# Patient Record
Sex: FEMALE | Race: WHITE | NOT HISPANIC OR LATINO | Employment: OTHER | URBAN - METROPOLITAN AREA
[De-identification: names, ages, dates, MRNs, and addresses within clinical notes are randomized per-mention and may not be internally consistent; named-entity substitution may affect disease eponyms.]

---

## 2017-01-05 ENCOUNTER — OFFICE VISIT (OUTPATIENT)
Dept: INTERNAL MEDICINE | Facility: CLINIC | Age: 37
End: 2017-01-05

## 2017-01-05 VITALS — HEART RATE: 88 BPM | DIASTOLIC BLOOD PRESSURE: 79 MMHG | SYSTOLIC BLOOD PRESSURE: 113 MMHG

## 2017-01-05 DIAGNOSIS — M25.572 PAIN IN JOINT INVOLVING ANKLE AND FOOT, LEFT: Primary | ICD-10-CM

## 2017-01-05 ASSESSMENT — PAIN SCALES - GENERAL: PAINLEVEL: EXTREME PAIN (9)

## 2017-01-05 NOTE — PATIENT INSTRUCTIONS
[facility] Center Medication Refill Request Information:  * Please contact your pharmacy regarding ANY request for medication refills.  ** [facility] Prescription Fax = [phone]  * Please allow 3 business days for routine medication refills.  * Please allow 5 business days for controlled substance medication refills.     [facility] Center Test Result notification information:  *You will be notified with in 7-10 days of your appointment day regarding the results of your test.  If you are on MyChart you will be notified as soon as the provider has reviewed the results and signed off on them.
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
## 2017-01-05 NOTE — NURSING NOTE
Chief Complaint   Patient presents with     ER F/U     Pt is here to follow up after being to the ER.      Mimi Dorado LPN January 5, 2017 10:07 AM

## 2017-01-10 NOTE — PROGRESS NOTES
Attestation:  I, Lizeth Barker, saw this patient with the resident and agree with the resident s findings and plan of care as documented in the resident s note.      Lizeth Barker MD

## 2017-05-09 ENCOUNTER — TRANSFERRED RECORDS (OUTPATIENT)
Dept: HEALTH INFORMATION MANAGEMENT | Facility: CLINIC | Age: 37
End: 2017-05-09

## 2017-05-09 ENCOUNTER — OFFICE VISIT (OUTPATIENT)
Dept: INTERNAL MEDICINE | Facility: CLINIC | Age: 37
End: 2017-05-09

## 2017-05-09 VITALS
SYSTOLIC BLOOD PRESSURE: 136 MMHG | OXYGEN SATURATION: 97 % | HEART RATE: 96 BPM | RESPIRATION RATE: 20 BRPM | DIASTOLIC BLOOD PRESSURE: 88 MMHG

## 2017-05-09 DIAGNOSIS — G43.119 INTRACTABLE MIGRAINE WITH AURA WITHOUT STATUS MIGRAINOSUS: Primary | ICD-10-CM

## 2017-05-09 LAB — PAP SMEAR - HIM PATIENT REPORTED: NEGATIVE

## 2017-05-09 ASSESSMENT — PAIN SCALES - GENERAL: PAINLEVEL: SEVERE PAIN (6)

## 2017-05-09 NOTE — PATIENT INSTRUCTIONS
Primary Care Center Medication Refill Request Information:  * Please contact your pharmacy regarding ANY request for medication refills.  ** [facility] Prescription Fax = [phone]  * Please allow 3 business days for routine medication refills.  * Please allow 5 business days for controlled substance medication refills.     [facility] Center Test Result notification information:  *You will be notified with in 7-10 days of your appointment day regarding the results of your test.  If you are on MyChart you will be notified as soon as the provider has reviewed the results and signed off on them.    Primary Care Center [phone]   Mental Health Intake Line: [phone] ([place])
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
## 2017-05-09 NOTE — MR AVS SNAPSHOT
After Visit Summary   5/9/2017    Melba Cornelius    MRN: [id number]           Patient Information     Date Of Birth          1980        Visit Information        Provider Department      5/9/2017 2:25 PM Jacqueline Black MD [facility]        Care Instructions    Primary Care Center Medication Refill Request Information:  * Please contact your pharmacy regarding ANY request for medication refills.  ** [facility] Prescription Fax = [phone]  * Please allow 3 business days for routine medication refills.  * Please allow 5 business days for controlled substance medication refills.     [facility] Center Test Result notification information:  *You will be notified with in 7-10 days of your appointment day regarding the results of your test.  If you are on MyChart you will be notified as soon as the provider has reviewed the results and signed off on them.    [facility] [phone]   Mental Health Intake Line: [phone] ([place])        Follow-ups after your visit        Who to contact     Please call your clinic at [phone] to:    Ask questions about your health    Make or cancel appointments    Discuss your medicines    Learn about your test results    Speak to your doctor   If you have compliments or concerns about an experience at your clinic, or if you wish to file a complaint, please contact [facility] Patient Relations at [phone] or email us at [email or web address]         Additional Information About Your Visit        MyChart Information     HOSTEXt is an electronic gateway that provides easy, online access to your medical records. With HOSTEXt, you can request a clinic appointment, read your test results, renew a prescription or communicate with your care team.     To sign up for HOSTEXt visit the website at [email or web address]   You will be asked to enter the access code listed below, as well as some  personal information. Please follow the directions to create your username and password.     Your access code is: [email or web address]  Expires: 2017  3:13 PM     Your access code will  in 90 days. If you need help or a new code, please contact your [facility] or call [phone] for assistance.        Care EveryWhere ID     This is your Care EveryWhere ID. This could be used by other organizations to access your [place] medical records  [id number]        Your Vitals Were     Pulse Respirations Pulse Oximetry             96 20 97%          Blood Pressure from Last 3 Encounters:   17 136/88   17 113/79   11/15/16 121/84    Weight from Last 3 Encounters:   11/15/16 74.7 kg (164 lb 11.2 oz)   16 73 kg (160 lb 14.4 oz)   10/21/16 72.6 kg (160 lb)              Today, you had the following     No orders found for display       Primary Care Provider Office Phone # Fax #    Jacqueline Angelita Black [phone]       [address]  [facility] [address]        Thank you!     Thank you for choosing [facility]  for your care. Our goal is always to provide you with excellent care. Hearing back from our patients is one way we can continue to improve our services. Please take a few minutes to complete the written survey that you may receive in the mail after your visit with us. Thank you!             Your Updated Medication List - Protect others around you: Learn how to safely use, store and throw away your medicines at www.disposemymeds.org.          This list is accurate as of: 17  3:13 PM.  Always use your most recent med list.                   Brand Name Dispense Instructions for use    traMADol 50 MG tablet    ULTRAM    30 tablet    Take 1 tablet (50 mg) by mouth every 8 hours as needed for moderate pain       VISTARIL PO      Take 50 mg by mouth as needed for itching

## 2017-06-07 ENCOUNTER — TELEPHONE (OUTPATIENT)
Dept: ORTHOPEDICS | Facility: CLINIC | Age: 37
End: 2017-06-07

## 2017-06-07 NOTE — TELEPHONE ENCOUNTER
Patient called back and she said that she [place] [facility] [place] where she had the left ankle surgery.

## 2017-06-07 NOTE — TELEPHONE ENCOUNTER
"Called phone number listed and voice message said \"Jorge\" so did not leave voice message. Wanting to get left ankle records from [facility] [place] and [facility].   "

## 2017-06-08 ENCOUNTER — OFFICE VISIT (OUTPATIENT)
Dept: ORTHOPEDICS | Facility: CLINIC | Age: 37
End: 2017-06-08

## 2017-06-08 VITALS — HEIGHT: 65 IN | WEIGHT: 186 LBS | BODY MASS INDEX: 30.99 KG/M2 | RESPIRATION RATE: 18 BRPM

## 2017-06-08 DIAGNOSIS — S82.892S ANKLE FRACTURE, LEFT, SEQUELA: ICD-10-CM

## 2017-06-08 DIAGNOSIS — M25.572 LEFT ANKLE PAIN, UNSPECIFIED CHRONICITY: Primary | ICD-10-CM

## 2017-06-08 DIAGNOSIS — M51.26 DISPLACEMENT OF LUMBAR INTERVERTEBRAL DISC WITHOUT MYELOPATHY: ICD-10-CM

## 2017-06-08 RX ORDER — CITALOPRAM HYDROBROMIDE 20 MG/1
20 TABLET ORAL
COMMUNITY
Start: 2017-06-03 | End: 2017-06-17

## 2017-06-08 RX ORDER — TRAMADOL HYDROCHLORIDE 50 MG/1
50 TABLET ORAL EVERY 8 HOURS PRN
Qty: 30 TABLET | Refills: 0 | Status: SHIPPED | OUTPATIENT
Start: 2017-06-08 | End: 2018-10-30

## 2017-06-08 RX ORDER — HYDROXYZINE HYDROCHLORIDE 50 MG/1
50 TABLET, FILM COATED ORAL
COMMUNITY
Start: 2017-05-20 | End: 2021-02-23

## 2017-06-08 RX ORDER — ARIPIPRAZOLE 10 MG/1
10 TABLET ORAL
COMMUNITY
Start: 2017-06-03 | End: 2018-10-30
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
## 2017-06-08 NOTE — MR AVS SNAPSHOT
After Visit Summary   6/8/2017    Melba Cornelius    MRN: [id number]           Patient Information     Date Of Birth          1980        Visit Information        Provider Department      6/8/2017 3:45 PM Aaron Valle MD [facility] Sports Medicine        Today's Diagnoses     Left ankle pain, unspecified chronicity    -  1    Ankle fracture, left, sequela        Displacement of lumbar intervertebral disc without myelopathy           Follow-ups after your visit        Additional Services     PHYSICAL THERAPY REFERRAL (Internal)       Physical Therapy Referral    ROM, strengthening and desensitization.                  Your next 10 appointments already scheduled     Jun 13, 2017  2:30 PM CDT   (Arrive by 2:15 PM)   NEW HERNIA SURGERY with Sherman Perdue MD   [facility] General Surgery ([facility])    [address] [place] [address]  4th [facility] [address] [phone]           Do not wear perfume.              Who to contact     Please call your clinic at [phone] to:    Ask questions about your health    Make or cancel appointments    Discuss your medicines    Learn about your test results    Speak to your doctor   If you have compliments or concerns about an experience at your clinic, or if you wish to file a complaint, please contact [facility] Patient Relations at [phone] or email us at [email or web address]         Additional Information About Your Visit        MyChart Information     [employer] is an electronic gateway that provides easy, online access to your medical records. With [employer], you can request a clinic appointment, read your test results, renew a prescription or communicate with your care team.     To sign up for [employer] visit the website at [email or web address]   You will be asked to enter the access code listed below, as well as some personal information. Please follow the directions to  "create your username and password.     Your access code is: [email or web address]  Expires: 2017  3:13 PM     Your access code will  in 90 days. If you need help or a new code, please contact your [facility] or call [phone] for assistance.        Care EveryWhere ID     This is your Care EveryWhere ID. This could be used by other organizations to access your [place] medical records  [id number]        Your Vitals Were     Respirations Height BMI (Body Mass Index)             18 5' 5\" (1.651 m) 30.95 kg/m2          Blood Pressure from Last 3 Encounters:   17 136/88   17 113/79   11/15/16 121/84    Weight from Last 3 Encounters:   17 186 lb (84.4 kg)   11/15/16 164 lb 11.2 oz (74.7 kg)   16 160 lb 14.4 oz (73 kg)              We Performed the Following     PHYSICAL THERAPY REFERRAL (Internal)          Where to get your medicines      Some of these will need a paper prescription and others can be bought over the counter.  Ask your nurse if you have questions.     Bring a paper prescription for each of these medications     traMADol 50 MG tablet          Primary Care Provider Office Phone # Fax #    Jacqueline Black [phone]       [address] [address]        Thank you!     Thank you for choosing [facility]  for your care. Our goal is always to provide you with excellent care. Hearing back from our patients is one way we can continue to improve our services. Please take a few minutes to complete the written survey that you may receive in the mail after your visit with us. Thank you!             Your Updated Medication List - Protect others around you: Learn how to safely use, store and throw away your medicines at www.disposemymeds.org.          This list is accurate as of: 17 11:59 PM.  Always use your most recent med list.                   Brand Name Dispense Instructions " for use    ARIPiprazole 10 MG tablet    ABILIFY     Take 10 mg by mouth       citalopram 20 MG tablet    celeXA     Take 20 mg by mouth       hydrOXYzine 50 MG tablet    ATARAX     Take 50 mg by mouth       traMADol 50 MG tablet    ULTRAM    30 tablet    Take 1 tablet (50 mg) by mouth every 8 hours as needed for moderate pain       VISTARIL PO      Take 50 mg by mouth as needed for itching
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
## 2017-06-12 ENCOUNTER — CARE COORDINATION (OUTPATIENT)
Dept: SURGERY | Facility: CLINIC | Age: 37
End: 2017-06-12

## 2017-06-12 NOTE — PROGRESS NOTES
Pre Visit Call and Assessment    Date of call:  06/12/2017    Phone numbers:  Home number on file [phone] (home)    Reached patient/confirmed appointment:  No- different name on VM message.  No message left.  Patient care team/Primary provider:  Jacqueline Black  [facility] outpatient pharmacy:    [facility]  [address] [facility] [address]  Phone: [phone] Fax: [phone]    [facility] [address] [address] AT [facility] & [address] [place] [address]  [address] [facility] [address]  Phone: [phone] Fax: [phone]    [facility] - [place], [place] - [address] [facility]  [address] [place] [address] AVE [address] [place] [address]  Phone: [phone] Fax: [phone]    [id number] - [place], [place] - [address] AT [facility]  [address]  [place] [address]  Phone: [phone] Fax: [phone]    [facility] [address] [place], [place] - [address] AT [facility] & [address]  [address]  [facility] [address]  Phone: [phone] Fax: [phone]    [facility] [address] [facility] [address]  [address] [facility] [address]  [facility] [address]  Phone: [phone] Fax: [phone] Alternate Fax: [phone], [phone]    Referred to:  Dr. Sherman Perdue    Reason for visit:  Hernia    Problem List:    Patient Active Problem List   Diagnosis     Intractable migraine with aura     Depressive disorder, not elsewhere classified     Smoker     Auditory hallucinations     Acne     CARDIOVASCULAR SCREENING; LDL GOAL LESS THAN 160       Allergies:     Allergies   Allergen Reactions     Droperidol Shortness Of Breath     Haldol Anxiety, Difficulty breathing, Muscle Pain (Myalgia), Palpitations, Photosensitivity, Shortness Of Breath and Visual Disturbance     Haloperidol Shortness Of Breath     Other  reaction(s): Tetany     Ketorolac Shortness Of Breath     Promethazine Other (See Comments) and Shortness Of Breath     anxiety     Bupropion Other (See Comments) and Unknown     Side effect  Side effect.  Became very suicidal and physically ill.  Suicidal thoughts     Inapsine [Butyrophenones]      No Clinical Screening - See Comments      See Scan  For Multiple Intolerances     Phenothiazine      Wellbutrin [Bupropion Hcl]      Metoclopramide Anxiety       History:      Past Medical History:   Diagnosis Date     Acne      Major depressive disorder      Migraine headache      Polysubstance abuse     meth, hx of commitment     Smoker      Suicide attempt (H)     via drug overdose       Past Surgical History:   Procedure Laterality Date     BACK SURGERY       C LT X-RAY HEEL      surgery     HC REMOVE TONSILS/ADENOIDS,12+ Y/O      T & A 14+y.o.     HC TOOTH EXTRACTION W/FORCEP         Family History   Problem Relation Age of Onset     DIABETES Maternal Aunt      CANCER No family hx of      HEART DISEASE Maternal Grandmother       at 55     Neurologic Disorder No family hx of        Social History   Substance Use Topics     Smoking status: Current Every Day Smoker     Smokeless tobacco: Never Used      Comment: TRYING TO QUIT     Alcohol use No       Patient instructions:    *  Bring outside medical records, images/disc, and/or studies

## 2017-06-14 ENCOUNTER — OFFICE VISIT (OUTPATIENT)
Dept: SURGERY | Facility: CLINIC | Age: 37
End: 2017-06-14

## 2017-06-14 VITALS
DIASTOLIC BLOOD PRESSURE: 78 MMHG | SYSTOLIC BLOOD PRESSURE: 114 MMHG | BODY MASS INDEX: 32.57 KG/M2 | HEIGHT: 65 IN | TEMPERATURE: 97.7 F | HEART RATE: 91 BPM | WEIGHT: 195.5 LBS | OXYGEN SATURATION: 99 %

## 2017-06-14 DIAGNOSIS — K43.2 VENTRAL INCISIONAL HERNIA WITHOUT OBSTRUCTION OR GANGRENE: Primary | ICD-10-CM

## 2017-06-14 PROBLEM — K43.9 ABDOMINAL WALL HERNIA: Status: ACTIVE | Noted: 2017-03-28

## 2017-06-14 ASSESSMENT — PAIN SCALES - GENERAL: PAINLEVEL: MILD PAIN (2)

## 2017-06-14 NOTE — NURSING NOTE
"Chief Complaint   Patient presents with     Consult     consult       Vitals:    06/14/17 0850   BP: 114/78   BP Location: Left arm   Patient Position: Chair   Cuff Size: Adult Regular   Pulse: 91   Temp: 97.7  F (36.5  C)   SpO2: 99%   Weight: 195 lb 8 oz   Height: 5' 5\"       Body mass index is 32.53 kg/(m^2).  Amy Mantilla MA                          "

## 2017-06-14 NOTE — LETTER
6/14/2017       RE: Melba Cornelius  [address]  [facility] [address]     Dear Colleague,    Thank you for referring your patient, Melba Cornelius, to the [facility] GENERAL SURGERY at [facility]. Please see a copy of my visit note below.    Melba Cornelius is a 36 year old female with a 1 year history of a left abdominal mass associated with the following symptoms of lump.    Finding was not work related. Came about soon after her back surgery.  Onset did not occur with lifting.  Obstructive symptoms:  no  Urinary difficulties:  no  Chronic cough: no  Constipation:  no  Current level of activity:  Low, currently unemployed but looking to return to light duty work.    Past medical history, medications, allergies, family history, and social history were reviewed with the patient. Complicated medical surgical psychiatric history. Not diabetic. Does smoke tobacco.    ROS: 10 point review of systems negative except noted in HPI  PHYSICAL EXAM  General appearance- flat affect, alert, and in no distress.  Skin- Skin color, texture, and turgor noted, tattoos on face.  Neck- Neck is supple with no obvious adenopathy.  Lungs- Respiratory effort unlabored.  Gait- Normal.  Abdomen - soft non tender overweight, well healed left flank incision with associated wide based hernia. Not large.  Impression: Complicated left flank incisional ventral hernia with complicated presentation.  Needs: routine regular primary care.  Chronic pain specialist for post op pain control  Tobacco cessation program  Abdominal binder when up OOB, binder dispensed today.  Follow up with me after 6 months of no smoking.  The total time spent with this patient and her friend was 30 minutes.  Of this time, greater than 50% was spent counseling and coordinating care.      Sherman Perdue MD

## 2017-06-14 NOTE — PROGRESS NOTES
Melba Cornelius is a 36 year old female with a 1 year history of a left abdominal mass associated with the following symptoms of lump.    Finding was not work related. Came about soon after her back surgery.  Onset did not occur with lifting.  Obstructive symptoms:  no  Urinary difficulties:  no  Chronic cough: no  Constipation:  no  Current level of activity:  Low, currently unemployed but looking to return to light duty work.    Past medical history, medications, allergies, family history, and social history were reviewed with the patient. Complicated medical surgical psychiatric history. Not diabetic. Does smoke tobacco.    ROS: 10 point review of systems negative except noted in HPI  PHYSICAL EXAM  General appearance- flat affect, alert, and in no distress.  Skin- Skin color, texture, and turgor noted, tattoos on face.  Neck- Neck is supple with no obvious adenopathy.  Lungs- Respiratory effort unlabored.  Gait- Normal.  Abdomen - soft non tender overweight, well healed left flank incision with associated wide based hernia. Not large.  Impression: Complicated left flank incisional ventral hernia with complicated presentation.  Needs: routine regular primary care.  Chronic pain specialist for post op pain control  Tobacco cessation program  Abdominal binder when up OOB, binder dispensed today.  Follow up with me after 6 months of no smoking.  The total time spent with this patient and her friend was 30 minutes.  Of this time, greater than 50% was spent counseling and coordinating care.

## 2017-06-14 NOTE — PATIENT INSTRUCTIONS
Continue care with Psychiatry provider, consult at Pain Clinic, and quit smoking.  Hernia can be repaired after 6 months of being tobacco free.    Wear abdominal binder during waking hours

## 2017-06-14 NOTE — MR AVS SNAPSHOT
After Visit Summary   6/14/2017    Melba Cornelius    MRN: [id number]           Patient Information     Date Of Birth          1980        Visit Information        Provider Department      6/14/2017 8:30 AM Sherman Perdue MD [facility] General Surgery        Today's Diagnoses     Ventral incisional hernia without obstruction or gangrene    -  1      Care Instructions    Continue care with Psychiatry provider, consult at Pain Clinic, and quit smoking.  Hernia can be repaired after 6 months of being tobacco free.    Wear abdominal binder during waking hours           Follow-ups after your visit        Your next 10 appointments already scheduled     Jun 20, 2017  3:05 PM CDT   (Arrive by 2:50 PM)   Return Visit with Jacqueline Black MD   [facility] Care Clinic ([email or web address])    [address] [place] [address] [phone]              Who to contact     Please call your clinic at [phone] to:    Ask questions about your health    Make or cancel appointments    Discuss your medicines    Learn about your test results    Speak to your doctor   If you have compliments or concerns about an experience at your clinic, or if you wish to file a complaint, please contact [facility] Patient Relations at [phone] or email us at [email or web address]         Additional Information About Your Visit        MyChart Information     [employer] is an electronic gateway that provides easy, online access to your medical records. With [employer], you can request a clinic appointment, read your test results, renew a prescription or communicate with your care team.     To sign up for [employer] visit the website at [email or web address]   You will be asked to enter the access code listed below, as well as some personal information. Please follow the directions to create your username and password.     Your access  "code is: [email or web address]  Expires: 2017  3:13 PM     Your access code will  in 90 days. If you need help or a new code, please contact your [facility] or call [phone] for assistance.        Care EveryWhere ID     This is your Care EveryWhere ID. This could be used by other organizations to access your [place] medical records  [id number]        Your Vitals Were     Pulse Temperature Height Pulse Oximetry BMI (Body Mass Index)       91 97.7  F (36.5  C) 5' 5\" 99% 32.53 kg/m2        Blood Pressure from Last 3 Encounters:   17 114/78   17 136/88   17 113/79    Weight from Last 3 Encounters:   17 195 lb 8 oz   17 186 lb   11/15/16 164 lb 11.2 oz              Today, you had the following     No orders found for display         Today's Medication Changes          These changes are accurate as of: 17  9:50 AM.  If you have any questions, ask your nurse or doctor.               Start taking these medicines.        Dose/Directions    order for DME   Used for:  Ventral incisional hernia without obstruction or gangrene   Started by:  Sherman Perdue MD        Abdominal Binder - Medium   Quantity:  1 each   Refills:  0            Where to get your medicines      Some of these will need a paper prescription and others can be bought over the counter.  Ask your nurse if you have questions.     Bring a paper prescription for each of these medications     order for DME                Primary Care Provider Office Phone # Fax #    Jacqueline Angelita Black [phone]       [address] [address]        Thank you!     Thank you for choosing [facility] GENERAL SURGERY  for your care. Our goal is always to provide you with excellent care. Hearing back from our patients is one way we can continue to improve our services. Please take a few minutes to complete the written survey that you may receive in " the mail after your visit with us. Thank you!             Your Updated Medication List - Protect others around you: Learn how to safely use, store and throw away your medicines at www.disposemymeds.org.          This list is accurate as of: 6/14/17  9:50 AM.  Always use your most recent med list.                   Brand Name Dispense Instructions for use    ARIPiprazole 10 MG tablet    ABILIFY     Take 10 mg by mouth       citalopram 20 MG tablet    celeXA     Take 20 mg by mouth       hydrOXYzine 50 MG tablet    ATARAX     Take 50 mg by mouth       order for DME     1 each    Abdominal Binder - Medium       traMADol 50 MG tablet    ULTRAM    30 tablet    Take 1 tablet (50 mg) by mouth every 8 hours as needed for moderate pain       VISTARIL PO      Take 50 mg by mouth as needed for itching

## 2017-06-20 ENCOUNTER — TELEPHONE (OUTPATIENT)
Dept: ORTHOPEDICS | Facility: CLINIC | Age: 37
End: 2017-06-20

## 2017-06-20 ENCOUNTER — OFFICE VISIT (OUTPATIENT)
Dept: INTERNAL MEDICINE | Facility: CLINIC | Age: 37
End: 2017-06-20

## 2017-06-20 ENCOUNTER — THERAPY VISIT (OUTPATIENT)
Dept: PHYSICAL THERAPY | Facility: CLINIC | Age: 37
End: 2017-06-20
Payer: MEDICARE

## 2017-06-20 VITALS
WEIGHT: 197.2 LBS | SYSTOLIC BLOOD PRESSURE: 113 MMHG | DIASTOLIC BLOOD PRESSURE: 84 MMHG | HEART RATE: 92 BPM | BODY MASS INDEX: 32.82 KG/M2

## 2017-06-20 DIAGNOSIS — M25.572 PAIN IN JOINT INVOLVING ANKLE AND FOOT, LEFT: Primary | ICD-10-CM

## 2017-06-20 DIAGNOSIS — F17.210 CIGARETTE NICOTINE DEPENDENCE WITHOUT COMPLICATION: Primary | ICD-10-CM

## 2017-06-20 DIAGNOSIS — S92.009A: ICD-10-CM

## 2017-06-20 PROCEDURE — 97162 PT EVAL MOD COMPLEX 30 MIN: CPT | Mod: GP | Performed by: PHYSICAL THERAPIST

## 2017-06-20 PROCEDURE — 97110 THERAPEUTIC EXERCISES: CPT | Mod: GP | Performed by: PHYSICAL THERAPIST

## 2017-06-20 PROCEDURE — G8978 MOBILITY CURRENT STATUS: HCPCS | Mod: GP | Performed by: PHYSICAL THERAPIST

## 2017-06-20 PROCEDURE — G8980 MOBILITY D/C STATUS: HCPCS | Mod: GP | Performed by: PHYSICAL THERAPIST

## 2017-06-20 PROCEDURE — G8979 MOBILITY GOAL STATUS: HCPCS | Mod: GP | Performed by: PHYSICAL THERAPIST

## 2017-06-20 RX ORDER — CITALOPRAM HYDROBROMIDE 20 MG/1
30 TABLET ORAL DAILY
COMMUNITY
End: 2023-06-02

## 2017-06-20 RX ORDER — NICOTINE 21 MG/24HR
1 PATCH, TRANSDERMAL 24 HOURS TRANSDERMAL EVERY 24 HOURS
Qty: 30 PATCH | Refills: 1 | Status: SHIPPED | OUTPATIENT
Start: 2017-06-20 | End: 2017-06-22

## 2017-06-20 ASSESSMENT — PAIN SCALES - GENERAL: PAINLEVEL: MODERATE PAIN (4)

## 2017-06-20 NOTE — LETTER
DEPARTMENT OF HEALTH AND HUMAN SERVICES  CENTERS FOR MEDICARE & MEDICAID SERVICES    PLAN/UPDATED PLAN OF PROGRESS FOR OUTPATIENT REHABILITATION    PATIENTS NAME:  Melba Cornelius   : 1980  PROVIDER NUMBER:    [id number]  [id number]  PROVIDER NAME: [facility] [place]  MEDICAL RECORD NUMBER: [id number]   START OF CARE DATE:  SOC Date: 17   TYPE:  PT  PRIMARY/TREATMENT DIAGNOSIS: (Pertinent Medical Diagnosis)  Pain in joint involving ankle and foot, left  Fracture of calcaneus  VISITS FROM START OF CARE:  Rxs Used: 1     KEY PT FINDINGS:  1) Severely limited ankle motion and foot motion  2) Poor weight bearing tolerance   3) Strength not assessed due to pain complaints today    Physical Therapy Initial Evaluation: Subjective History     Injury/Condition Details:  Presenting Complaint Left Ankle   Onset Timing/Date MD referral: 2017  6/3/2016   Mechanism Patient fell and fractured her calcaneus. She had surgery in [place] and has been in a boot since surgery. She was instructed to be out of the boot 2 weeks ago by Dr. Valle and she has not worn the boot since then.   *See previous initial evaluation for full report of mechanism of injury     Symptom Behavior Details    Primary Symptoms Sporadic symptoms; Activity/position dependent, pain (Location: Anterior ankle symptoms, heel pain present with prolonged walking, tingling present at the base of the toes, Quality: Aching/Throbbing), stiffness, weakness, swelling   Worst Pain 10/10 (with walking)   Symptom Provocators 10-15 minute limit with walking, stairs (puts her foot to the side)   Best Pain 0/10    Symptom Relievers Not putting weight on her foot   Time of day dependent? Worse in morning and Stiffness in morning   Recent symptom change? symptoms improving     Prior Testing/Intervention for current condition:  Prior Tests  x-ray   Prior Treatment PT (1 visit only) and Brace (Boot for 1 year)   PATIENTS NAME:  Adryan Melba    : 1980    Lifestyle & General Medical History:  Employment Temporarily unemployed   Usual physical activities  (within past year) Walking, has snowboarded in the past & would like to do that again   Orthopaedic history LBP   Notable medical history See Epic Chart - Hernia surgery is scheduled     Objective:  Standing Alignment:    Ankle/foot deviations: Not assessed today due to high levels of pain (limited standing examination)  Gait:  Antalgic gait pattern, increased frontal plan trunk motion. Does not ambulate with assistive device.   Flexibility/Screens:   Lower Extremity:  Decreased left lower extremity flexibility:Gastroc and Soleus  Decreased right lower extremity flexibility:  Gastroc and Soleus  Ankle/Foot Evaluation  ROM:    AROM:    Dorsiflexion:  Left:   To neutral  Right:   10 degrees  Plantarflexion:  Left:  15    Right:  30  Inversion:  Left:  5     Right:  15  Eversion:  Neutral     Right:  10  Great toe flexion: Left:  Moderate limit     Right:   Great Toe Extension: Left:  Severe limit     Right:  PROM:    Endfeel:  Not assessed - PROM not performed today  Strength is not assessed.  LIGAMENT TESTING: not assessed  PALPATION: Palpation of ankle: Decreased scar mobility along lateral calcaneus.  Left ankle tenderness present at:  medial calcaneal (Along edge of calcaneus, not PF attachment)  EDEMA:   Left ankle edema present at: 51.4, 20cm prox: 32.2  Right ankle edema present at:  50.8, 20cm prox: 36.4      Figure 8 left: 51.4, 20cm prox: 32.2Figure 8 right: 50.8, 20cm prox: 36.4  MOBILITY TESTING:   Talocrural Left: hypomobile      Subtalar Left: hypomobile      Midtarsal Left: hypomobile      First Ray Left: hypomobile      FUNCTIONAL TESTS:   Proprioception:  Stork Balance Test: % of Uninvolved: Not assessed today        PATIENTS NAME:  Melba Cornelius   : 1980    Assessment/Plan:    Patient is a 36 year old female with left side ankle complaints.    Patient has the following  significant findings with corresponding treatment plan.                Diagnosis 1:  Calcaneal Fracture  Pain -  hot/cold therapy, manual therapy, STS, splint/taping/bracing/orthotics, self management and education  Decreased ROM/flexibility - manual therapy, therapeutic exercise and home program  Decreased joint mobility - manual therapy, therapeutic exercise and home program  Decreased strength - therapeutic exercise, therapeutic activities and home program  Impaired balance - neuro re-education, therapeutic activities and home program  Decreased proprioception - neuro re-education, therapeutic activities and home program  Edema - vasopneumatics and cryocuff  Impaired gait - gait training and home program  Impaired muscle performance - neuro re-education and home program  Decreased function - therapeutic activities and home program  Therapy Evaluation Codes:   1) History comprised of:   Personal factors that impact the plan of care:      Anxiety, Cognition, Coping style, Living environment, Overall behavior pattern, Past/current experiences, Social history/culture and Time since onset of symptoms.    Comorbidity factors that impact the plan of care are:      Chemical Dependency, Mental illness, Numbness/tingling, Overweight, Pain at night/rest and Weakness.     Medications impacting care: Pain.  2) Examination of Body Systems comprised of:   Body structures and functions that impact the plan of care:      Ankle and Lumbar spine.   Activity limitations that impact the plan of care are:      Lifting, Sitting, Squatting/kneeling, Stairs, Standing, Walking and Sleeping.  3) Clinical presentation characteristics are:   Evolving/Changing.  4) Decision-Making    Moderate complexity using standardized patient assessment instrument and/or measureable assessment of functional outcome.  Cumulative Therapy Evaluation is: Moderate complexity.  Previous and current functional limitations:  (See Goal Flow Sheet for this  "information)    Short term and Long term goals: (See Goal Flow Sheet for this information)   Communication ability:  Patient appears to be able to clearly communicate and understand verbal and written communication and follow directions correctly.  Treatment Explanation - The following has been discussed with the patient:   RX ordered/plan of care  Anticipated outcomes  Possible risks and side effects  This patient would benefit from PT intervention to resume normal activities.   Rehab potential is good.  Frequency:  1 X week, once daily  Duration:  for 4 weeks then transition to 2 X month for 2 months  Discharge Plan:  Achieve all LTG.  PATIENTS NAME:  Melba Cornelius   : 1980    Independent in home treatment program.  Reach maximal therapeutic benefit.                Caregiver Signature/Credentials _____________________________ Date ________       Elizabeth Eli, PT, DPT, OCS  I have reviewed and certified the need for these services and plan of treatment while under my care.        PHYSICIAN'S SIGNATURE:   ______________________________________ Date___________     Aaron Valle MD    Certification period:  Beginning of Cert date period: 17 to  End of Cert period date: 17     Functional Level Progress Report: Please see attached \"Goal Flow sheet for Functional level.\"    ____X____ Continue Services or       ________ DC Services                Service dates: From  SOC Date: 17 date to present                         "

## 2017-06-20 NOTE — TELEPHONE ENCOUNTER
"Melba calling to request Tramadol refill, rates pain 10/10 when standing or walking, 0/10 when off foor, \"I start Physical Therapy today, if I can't get a refill I'm going to stop the therapy\".  I informed Melba, per clinic note 6/6, Dr Valle was not going to refill Ultram.  I encouraged her to attend therapy as ordered, and contact [facility] for refill of pain medication if needed.    Dr Valle notified of request.  Frannie Valdez RN 11:25 AM 6/20/2017        "

## 2017-06-20 NOTE — PROGRESS NOTES
KEY PT FINDINGS:  1) Severely limited ankle motion and foot motion  2) Poor weight bearing tolerance   3) Strength not assessed due to pain complaints today    Physical Therapy Initial Evaluation: Subjective History     Injury/Condition Details:  Presenting Complaint Left Ankle   Onset Timing/Date MD referral: 6/8/2017  6/3/2016   Mechanism Patient fell and fractured her calcaneus. She had surgery in [place] and has been in a boot since surgery. She was instructed to be out of the boot 2 weeks ago by Dr. Valle and she has not worn the boot since then.   *See previous initial evaluation for full report of mechanism of injury     Symptom Behavior Details    Primary Symptoms Sporadic symptoms; Activity/position dependent, pain (Location: Anterior ankle symptoms, heel pain present with prolonged walking, tingling present at the base of the toes, Quality: Aching/Throbbing), stiffness, weakness, swelling   Worst Pain 10/10 (with walking)   Symptom Provocators 10-15 minute limit with walking, stairs (puts her foot to the side)   Best Pain 0/10    Symptom Relievers Not putting weight on her foot   Time of day dependent? Worse in morning and Stiffness in morning   Recent symptom change? symptoms improving     Prior Testing/Intervention for current condition:  Prior Tests  x-ray   Prior Treatment PT (1 visit only) and Brace (Boot for 1 year)     Lifestyle & General Medical History:  Employment Temporarily unemployed   Usual physical activities  (within past year) Walking, has snowboarded in the past & would like to do that again   Orthopaedic history LBP   Notable medical history See Epic Chart - Hernia surgery is scheduled     Subjective:    HPI                    Objective:    Standing Alignment:                Ankle/foot deviations: Not assessed today due to high levels of pain (limited standing examination)    Gait:  Antalgic gait pattern, increased frontal plan trunk motion. Does not ambulate with assistive device.          Flexibility/Screens:       Lower Extremity:  Decreased left lower extremity flexibility:Gastroc and Soleus    Decreased right lower extremity flexibility:  Gastroc and Soleus          Ankle/Foot Evaluation  ROM:    AROM:    Dorsiflexion:  Left:   To neutral  Right:   10 degrees  Plantarflexion:  Left:  15    Right:  30  Inversion:  Left:  5     Right:  15  Eversion:  Neutral     Right:  10  Great toe flexion: Left:  Moderate limit     Right:   Great Toe Extension: Left:  Severe limit     Right:  PROM:                  Endfeel:  Not assessed - PROM not performed today  Strength is not assessed.  LIGAMENT TESTING: not assessed                PALPATION: Palpation of ankle: Decreased scar mobility along lateral calcaneus.  Left ankle tenderness present at:  medial calcaneal (Along edge of calcaneus, not PF attachment)    EDEMA:   Left ankle edema present at: 51.4, 20cm prox: 32.2  Right ankle edema present at:  50.8, 20cm prox: 36.4      Figure 8 left: 51.4, 20cm prox: 32.2Figure 8 right: 50.8, 20cm prox: 36.4  MOBILITY TESTING:       Talocrural Left: hypomobile      Subtalar Left: hypomobile      Midtarsal Left: hypomobile      First Ray Left: hypomobile      FUNCTIONAL TESTS:           Proprioception:  Stork Balance Test: % of Uninvolved: Not assessed today                                                    General     ROS    Assessment/Plan:      Patient is a 36 year old female with left side ankle complaints.    Patient has the following significant findings with corresponding treatment plan.                Diagnosis 1:  Calcaneal Fracture  Pain -  hot/cold therapy, manual therapy, STS, splint/taping/bracing/orthotics, self management and education  Decreased ROM/flexibility - manual therapy, therapeutic exercise and home program  Decreased joint mobility - manual therapy, therapeutic exercise and home program  Decreased strength - therapeutic exercise, therapeutic activities and home program  Impaired  balance - neuro re-education, therapeutic activities and home program  Decreased proprioception - neuro re-education, therapeutic activities and home program  Edema - vasopneumatics and cryocuff  Impaired gait - gait training and home program  Impaired muscle performance - neuro re-education and home program  Decreased function - therapeutic activities and home program    Therapy Evaluation Codes:   1) History comprised of:   Personal factors that impact the plan of care:      Anxiety, Cognition, Coping style, Living environment, Overall behavior pattern, Past/current experiences, Social history/culture and Time since onset of symptoms.    Comorbidity factors that impact the plan of care are:      Chemical Dependency, Mental illness, Numbness/tingling, Overweight, Pain at night/rest and Weakness.     Medications impacting care: Pain.  2) Examination of Body Systems comprised of:   Body structures and functions that impact the plan of care:      Ankle and Lumbar spine.   Activity limitations that impact the plan of care are:      Lifting, Sitting, Squatting/kneeling, Stairs, Standing, Walking and Sleeping.  3) Clinical presentation characteristics are:   Evolving/Changing.  4) Decision-Making    Moderate complexity using standardized patient assessment instrument and/or measureable assessment of functional outcome.  Cumulative Therapy Evaluation is: Moderate complexity.    Previous and current functional limitations:  (See Goal Flow Sheet for this information)    Short term and Long term goals: (See Goal Flow Sheet for this information)     Communication ability:  Patient appears to be able to clearly communicate and understand verbal and written communication and follow directions correctly.  Treatment Explanation - The following has been discussed with the patient:   RX ordered/plan of care  Anticipated outcomes  Possible risks and side effects  This patient would benefit from PT intervention to resume normal  activities.   Rehab potential is good.    Frequency:  1 X week, once daily  Duration:  for 4 weeks then transition to 2 X month for 2 months  Discharge Plan:  Achieve all LTG.  Independent in home treatment program.  Reach maximal therapeutic benefit.    Please refer to the daily flowsheet for treatment today, total treatment time and time spent performing 1:1 timed codes.

## 2017-06-20 NOTE — NURSING NOTE
Chief Complaint   Patient presents with     Smoking Cessation     pt would like to quit smoking     Shahida Garrett CMA at 3:46 PM on 6/20/2017.

## 2017-06-20 NOTE — MR AVS SNAPSHOT
"              After Visit Summary   6/20/2017    Melba Cornelius    MRN: [id number]           Patient Information     Date Of Birth          1980        Visit Information        Provider Department      6/20/2017 1:20 PM Elizabeth Eli, [facility] [place]        Today's Diagnoses     Pain in joint involving ankle and foot, left    -  1    Fracture of calcaneus           Follow-ups after your visit        Your next 10 appointments already scheduled     Jun 28, 2017  2:40 PM CDT   [place] Extremity with [facility] [place] ([facility] [place])    [address] [place] [address] [address] [facility] [address] [phone]              Who to contact     If you have questions or need follow up information about today's clinic visit or your schedule please contact [facility]AP[facility] [place] directly at [phone].  Normal or non-critical lab and imaging results will be communicated to you by MyChart, letter or phone within 4 business days after the clinic has received the results. If you do not hear from us within 7 days, please contact the clinic through MyChart or phone. If you have a critical or abnormal lab result, we will notify you by phone as soon as possible.  Submit refill requests through [employer] or call your pharmacy and they will forward the refill request to us. Please allow 3 business days for your refill to be completed.          Additional Information About Your Visit        MyChart Information     [employer]fatutor lets you send messages to your doctor, view your test results, renew your prescriptions, schedule appointments and more. To sign up, go to [email or web address] . Click on \"Log in\" on the left side of the screen, which will take you to the Welcome page. Then click on \"Sign up Now\" on the right side of the page.     You will be asked to enter the access code listed below, as well as some personal information. Please follow the " directions to create your username and password.     Your access code is: [email or web address]  Expires: 2017  3:13 PM     Your access code will  in 90 days. If you need help or a new code, please call your [facility] clinic or [phone].        Care EveryWhere ID     This is your Care EveryWhere ID. This could be used by other organizations to access your [facility] medical records  [id number]         Blood Pressure from Last 3 Encounters:   17 113/84   17 114/78   17 136/88    Weight from Last 3 Encounters:   17 89.4 kg (197 lb 3.2 oz)   17 88.7 kg (195 lb 8 oz)   17 84.4 kg (186 lb)              We Performed the Following     HC PT EVAL, MODERATE COMPLEXITY     [place] CERT REPORT     THERAPEUTIC EXERCISES        Primary Care Provider Office Phone # Fax #    Jacqueline Angelita Black [phone]       [address] [address]        Thank you!     Thank you for choosing [facility] [place]  for your care. Our goal is always to provide you with excellent care. Hearing back from our patients is one way we can continue to improve our services. Please take a few minutes to complete the written survey that you may receive in the mail after your visit with us. Thank you!             Your Updated Medication List - Protect others around you: Learn how to safely use, store and throw away your medicines at www.disposemymeds.org.          This list is accurate as of: 17  3:51 PM.  Always use your most recent med list.                   Brand Name Dispense Instructions for use    ARIPiprazole 10 MG tablet    ABILIFY     Take 10 mg by mouth       BUSPAR PO      Take 10 mg by mouth 3 times daily       CITALOPRAM HYDROBROMIDE PO      Take 20 mg by mouth       hydrOXYzine 50 MG tablet    ATARAX     Take 50 mg by mouth       order for DME     1 each    Abdominal Binder - Medium       traMADol 50 MG tablet    ULTRAM    30 tablet     Take 1 tablet (50 mg) by mouth every 8 hours as needed for moderate pain       VISTARIL PO      Take 50 mg by mouth as needed for itching

## 2017-06-20 NOTE — MR AVS SNAPSHOT
After Visit Summary   6/20/2017    Melba Cornelius    MRN: [id number]           Patient Information     Date Of Birth          1980        Visit Information        Provider Department      6/20/2017 3:05 PM Jacqueline Black MD [facility]        Today's Diagnoses     Cigarette nicotine dependence without complication    -  1      Care Instructions    Primary Care Center Medication Refill Request Information:  * Please contact your pharmacy regarding ANY request for medication refills.  ** [facility] Prescription Fax = [phone]  * Please allow 3 business days for routine medication refills.  * Please allow 5 business days for controlled substance medication refills.     Primary Care Center Test Result notification information:  *You will be notified with in 7-10 days of your appointment day regarding the results of your test.  If you are on MyChart you will be notified as soon as the provider has reviewed the results and signed off on them.    [facility] [phone]             Follow-ups after your visit        Your next 10 appointments already scheduled     Jun 28, 2017  2:40 PM CDT   [place] Extremity with Elizabeth Eli PT   [facility] [place] ([facility])    [address] [place] [address] [phone]              Who to contact     Please call your clinic at [phone] to:    Ask questions about your health    Make or cancel appointments    Discuss your medicines    Learn about your test results    Speak to your doctor   If you have compliments or concerns about an experience at your clinic, or if you wish to file a complaint, please contact [facility] Patient Relations at [phone] or email us at [email or web address]         Additional Information About Your Visit        MyChart Information     Drivr is an electronic gateway that provides easy,  online access to your medical records. With [employer], you can request a clinic appointment, read your test results, renew a prescription or communicate with your care team.     To sign up for [employer] visit the website at [email or web address]   You will be asked to enter the access code listed below, as well as some personal information. Please follow the directions to create your username and password.     Your access code is: [email or web address]  Expires: 2017  3:13 PM     Your access code will  in 90 days. If you need help or a new code, please contact your [facility] or call [phone] for assistance.        Care EveryWhere ID     This is your Care EveryWhere ID. This could be used by other organizations to access your [facility] medical records  [id number]        Your Vitals Were     Pulse BMI (Body Mass Index)                92 32.82 kg/m2           Blood Pressure from Last 3 Encounters:   17 113/84   17 114/78   17 136/88    Weight from Last 3 Encounters:   17 89.4 kg (197 lb 3.2 oz)   17 88.7 kg (195 lb 8 oz)   17 84.4 kg (186 lb)              Today, you had the following     No orders found for display         Today's Medication Changes          These changes are accurate as of: 17 11:59 PM.  If you have any questions, ask your nurse or doctor.               Start taking these medicines.        Dose/Directions    * nicotine 14 MG/24HR 24 hr patch   Commonly known as:  NICODERM CQ   Used for:  Cigarette nicotine dependence without complication   Started by:  Jacqueline Black MD        Dose:  1 patch   Place 1 patch onto the skin every 24 hours   Quantity:  30 patch   Refills:  1       * nicotine 10 MG Inhaler   Commonly known as:  NICOTROL   Used for:  Cigarette nicotine dependence without complication   Started by:  Jacqueline Black MD        Dose:  6-16 cartridge   Inhale 6-16 cartridge into the lungs daily as  needed for smoking cessation   Quantity:  168 each   Refills:  1       * Notice:  This list has 2 medication(s) that are the same as other medications prescribed for you. Read the directions carefully, and ask your doctor or other care provider to review them with you.         Where to get your medicines      These medications were sent to [facility][id number] - [place], [place] - [address]  [address] [address]     Phone:  [phone]     nicotine 10 MG Inhaler    nicotine 14 MG/24HR 24 hr patch                Primary Care Provider Office Phone # Fax #    Jacqueline Angelita Black [phone]       [facility] [address]  [facility] [address]        Equal Access to Services     MILA THEODORE : Hadii faviola fordo Soluis alberto, waaxda luqadaha, qaybta kaalmada adeegyada, chase vazquez. So [facility] [phone].    ATENCIÓN: Si habla español, tiene a harris disposición servicios gratuitos de asistencia lingüística. Ll[facility] [phone].    We comply with applicable federal civil rights laws and [place] laws. We do not discriminate on the basis of race, color, national origin, age, disability sex, sexual orientation or gender identity.            Thank you!     Thank you for choosing [facility]  for your care. Our goal is always to provide you with excellent care. Hearing back from our patients is one way we can continue to improve our services. Please take a few minutes to complete the written survey that you may receive in the mail after your visit with us. Thank you!             Your Updated Medication List - Protect others around you: Learn how to safely use, store and throw away your medicines at www.disposemymeds.org.          This list is accurate as of: 6/20/17 11:59 PM.  Always use your most recent med list.                   Brand Name Dispense Instructions for use Diagnosis    ARIPiprazole 10 MG tablet     ABILIFY     Take 10 mg by mouth        BUSPAR PO      Take 10 mg by mouth 3 times daily        CITALOPRAM HYDROBROMIDE PO      Take 20 mg by mouth        hydrOXYzine 50 MG tablet    ATARAX     Take 50 mg by mouth        * nicotine 14 MG/24HR 24 hr patch    NICODERM CQ    30 patch    Place 1 patch onto the skin every 24 hours    Cigarette nicotine dependence without complication       * nicotine 10 MG Inhaler    NICOTROL    168 each    Inhale 6-16 cartridge into the lungs daily as needed for smoking cessation    Cigarette nicotine dependence without complication       order for DME     1 each    Abdominal Binder - Medium    Ventral incisional hernia without obstruction or gangrene       traMADol 50 MG tablet    ULTRAM    30 tablet    Take 1 tablet (50 mg) by mouth every 8 hours as needed for moderate pain    Displacement of lumbar intervertebral disc without myelopathy       VISTARIL PO      Take 50 mg by mouth as needed for itching        * Notice:  This list has 2 medication(s) that are the same as other medications prescribed for you. Read the directions carefully, and ask your doctor or other care provider to review them with you.

## 2017-06-22 DIAGNOSIS — M51.26 DISPLACEMENT OF LUMBAR INTERVERTEBRAL DISC WITHOUT MYELOPATHY: ICD-10-CM

## 2017-06-22 RX ORDER — NICOTINE 21 MG/24HR
1 PATCH, TRANSDERMAL 24 HOURS TRANSDERMAL EVERY 24 HOURS
Qty: 30 PATCH | Refills: 1 | Status: SHIPPED | OUTPATIENT
Start: 2017-06-22 | End: 2018-10-30

## 2017-06-23 NOTE — PROGRESS NOTES
Internal Medicine P[facility] Progress Note   06/22/2017    Melba Cornelius MRN# [id number]   Age: 36 year old YOB: 1980          Assessment and Plan:     Melba was seen today for smoking cessation.    Diagnoses and all orders for this visit:    Cigarette nicotine dependence without complication: Had been smoking about 1/2 ppd. Wanting to quit for elective hernia repair. Down to 4-5 per day with use of lozenges. Due to depression and multiple suicide attempts, chantix not an option. Has had bad reactions with bupropion in the past as well. Advise to use nicotine patch daily and lozenges for cravings. Would like to try inhaler in place of lozenge. Advised to check with insurance for coverage, but either lozenge or inhaler would be fine. Congratulated on desire and progress on smoking cessation.   -     nicotine (NICODERM CQ) 14 MG/24HR 24 hr patch; Place 1 patch onto the skin every 24 hours  -     nicotine (NICOTROL) 10 MG Inhaler; Inhale 6-16 cartridge into the lungs daily as needed for smoking cessation      Patient and plan of care discussed with Dr. Aneesh Black  PGY-3    [phone]     Subjective:     Melba Cornelius is a 36 year old y/o F with PMH of polysubstance abuse, bipolar disorder, and suicide attempts; who presents for tobacco cessation.    Melba reports that she has been working on quitting smoking and is now down to 4-5 cigarettes per day. She would like to have hernia repair down but was advised she needs to quit smoking first. She has just been using to lozenges, but is inquiring about other options. Also has been using a quit plan where she can call as well.     In terms of her mental health, things have been much better after she has been seen by psychiatrist and is back on medications. She has also been back to see orthopedics and is having PT again for her foot pain. Finally out of the cam boot which feels much better.     ROS: 4 system ROS was done. Pertinent positive and  negatives noted above.         Physical Exam:   Vitals:     /84  HR 92      Wt Readings from Last 4 Encounters:   06/20/17 89.4 kg (197 lb 3.2 oz)   06/14/17 88.7 kg (195 lb 8 oz)   06/08/17 84.4 kg (186 lb)   11/15/16 74.7 kg (164 lb 11.2 oz)         Gen: alert, pleasant, sitting up in chair; NAD  HEENT: multiple facial tattoos  Resp: breathing comfortably on room air  Neurological: alert and oriented x4           Laboratory & Imaging Data:     None    Pt was seen and examined with Dr. Black.  I agree with his documentation as noted above.    My additional comments: None    Mariel Melendrez MD

## 2017-06-26 RX ORDER — TRAMADOL HYDROCHLORIDE 50 MG/1
50 TABLET ORAL EVERY 8 HOURS PRN
Qty: 30 TABLET | Refills: 0 | OUTPATIENT
Start: 2017-06-26

## 2017-06-28 DIAGNOSIS — M51.26 DISPLACEMENT OF LUMBAR INTERVERTEBRAL DISC WITHOUT MYELOPATHY: ICD-10-CM

## 2017-06-28 NOTE — TELEPHONE ENCOUNTER
Tramadol 50mg      Last Written Prescription Date:  06/08/2017  Last Fill Quantity: 30,   # refills: 0  Last Office Visit with [facility] prescribing provider: 06/20/17  Future Office visit:       Routing refill request to provider for review/approval because:  Drug not on the [facility] refill protocol or controlled substance    Jasmina Chamberlain  [facility]  [phone]

## 2017-06-30 NOTE — TELEPHONE ENCOUNTER
Reason For Call:   Chief Complaint   Patient presents with     Refill Request            Medication Name, Dose and Monthly Quantity:   Tramadol (Ultram): Dose 50mg Schedule B8haale Monthly Quantity 30      Diagnosis requiring opiates:   Displacement of lumbar intervertebral disc without myelopathy [M51.26]    Problem List Updated:   No    Opioid Agreement On File - [facility] PAIN CONTRACT ID# [id number]:  No    Last Urine Drug Screen (at least once every 12 months) Date:   4/07/14  Unexpected Results:   Unsure    [place]  Data Reviewed (at least once every 3 months) Date:   6/30/17   Unexpected Results:    Yes. multiple prescribers for tramadol, though within normal frequency    Last Fill Date:   6/08/17    Last Visit with PCP:   6/20/17    Future Visits with PCP:   Yes:  Date 7/11/17 with new provider at [facility].    Processing:   Deliver to clinic pharmacy     Steffany Narayan RN

## 2017-07-05 RX ORDER — TRAMADOL HYDROCHLORIDE 50 MG/1
50 TABLET ORAL EVERY 8 HOURS PRN
OUTPATIENT
Start: 2017-07-07

## 2017-07-11 ENCOUNTER — OFFICE VISIT (OUTPATIENT)
Dept: INTERNAL MEDICINE | Facility: CLINIC | Age: 37
End: 2017-07-11

## 2017-07-11 DIAGNOSIS — M79.672 LEFT FOOT PAIN: ICD-10-CM

## 2017-07-11 DIAGNOSIS — Z13.9 SCREENING FOR CONDITION: ICD-10-CM

## 2017-07-11 DIAGNOSIS — G89.4 CHRONIC PAIN SYNDROME: ICD-10-CM

## 2017-07-11 DIAGNOSIS — Z13.9 SCREENING FOR CONDITION: Primary | ICD-10-CM

## 2017-07-11 ASSESSMENT — PAIN SCALES - GENERAL: PAINLEVEL: MILD PAIN (3)

## 2017-07-11 NOTE — NURSING NOTE
Chief Complaint   Patient presents with     Musculoskeletal Problem     pt states having pain in left foot      Referral     pt would like a referral for hernia surgery     Shahida Garrett CMA at 2:59 PM on 7/11/2017.

## 2017-07-11 NOTE — MR AVS SNAPSHOT
After Visit Summary   7/11/2017    Melba Cornelius    MRN: [id number]           Patient Information     Date Of Birth          1980        Visit Information        Provider Department      7/11/2017 3:00 PM Valentina Dixon MD [facility]        Today's Diagnoses     Screening for condition    -  1    Chronic pain syndrome           Care Instructions    Primary Care Center Medication Refill Request Information:  * Please contact your pharmacy regarding ANY request for medication refills.  ** [facility] Prescription Fax = [phone]  * Please allow 3 business days for routine medication refills.  * Please allow 5 business days for controlled substance medication refills.     Primary Care Center Test Result notification information:  *You will be notified with in 7-10 days of your appointment day regarding the results of your test.  If you are on MyChart you will be notified as soon as the provider has reviewed the results and signed off on them.    [facility] [phone]             Follow-ups after your visit        Follow-up notes from your care team     Return in about 1 week (around 7/18/2017).      Your next 10 appointments already scheduled     Jul 11, 2017  4:15 PM CDT   LAB with  LAB   [facility] ([facility])    [address] [place] [address] [phone]           Patient must bring picture ID.  Patient should be prepared to give a urine specimen  Please do not eat 10-12 hours before your appointment if you are coming in fasting for labs on lipids, cholesterol, or glucose (sugar).  Pregnant women should follow their Care Team instructions. Water with medications is okay. Do not drink coffee or other fluids.   If you have concerns about taking  your medications, please ask at office or if scheduling via [email or web address], send a message by clicking on Secure Messaging, Message Your Care Team.            Jul 18, 2017   3:00 PM CDT   (Arrive by 2:45 PM)   Return Visit with Valentina Dixon MD   [facility] ([facility])    [address] [place] [address] [phone]            Oct 18, 2017 12:50 PM CDT   (Arrive by 12:35 PM)   New Patient Visit with Mariya Valenzuela MD   [facility] ([facility])    [address] [place] [address] [phone]              Future tests that were ordered for you today     Open Future Orders        Priority Expected Expires Ordered    Urine drug screen with reported meds Routine 2017            Who to contact     Please call your clinic at [phone] to:    Ask questions about your health    Make or cancel appointments    Discuss your medicines    Learn about your test results    Speak to your doctor   If you have compliments or concerns about an experience at your clinic, or if you wish to file a complaint, please contact [facility] Patient Relations at [phone] or email us at [email or web address]         Additional Information About Your Visit        ShoutOmatichart Information     [employer] is an electronic gateway that provides easy, online access to your medical records. With [employer], you can request a clinic appointment, read your test results, renew a prescription or communicate with your care team.     To sign up for [employer] visit the website at [email or web address]   You will be asked to enter the access code listed below, as well as some personal information. Please follow the directions to create your username and password.     Your access code is: [email or web address]  Expires: 2017  3:13 PM     Your access code will  in 90 days. If you need help or a new code, please contact your [facility] or call [phone] for assistance.         Care EveryWhere ID     This is your Care EveryWhere ID. This could be used by other organizations to access your [facility] medical records  [id number]         Blood Pressure from Last 3 Encounters:   07/11/17 (P) 106/77   06/20/17 113/84   06/14/17 114/78    Weight from Last 3 Encounters:   07/11/17 (P) 92.1 kg (203 lb 1.6 oz)   06/20/17 89.4 kg (197 lb 3.2 oz)   06/14/17 88.7 kg (195 lb 8 oz)               Primary Care Provider Office Phone # Fax #    Jacqueline Angelita Black [phone]       [facility] [address]  [facility] [address]        Equal Access to Services     MILA THEODORE : Angela fordo Soluis alberto, waaxda luqadaha, qaybta kaalmada adeegyada, chase cortez hayparviz vazquez. So [facility] [phone].    ATENCIÓN: Si habla español, tiene a harris disposición servicios gratuitos de asistencia lingüística. Klaus al [phone].    We comply with applicable federal civil rights laws and [place] laws. We do not discriminate on the basis of race, color, national origin, age, disability sex, sexual orientation or gender identity.            Thank you!     Thank you for choosing [facility]  for your care. Our goal is always to provide you with excellent care. Hearing back from our patients is one way we can continue to improve our services. Please take a few minutes to complete the written survey that you may receive in the mail after your visit with us. Thank you!             Your Updated Medication List - Protect others around you: Learn how to safely use, store and throw away your medicines at www.disposemymeds.org.          This list is accurate as of: 7/11/17  3:56 PM.  Always use your most recent med list.                   Brand Name Dispense Instructions for use Diagnosis    ARIPiprazole 10 MG tablet    ABILIFY     Take 10 mg by mouth        BUSPAR PO      Take 10 mg by mouth 3 times daily        CITALOPRAM HYDROBROMIDE PO      Take  20 mg by mouth        hydrOXYzine 50 MG tablet    ATARAX     Take 50 mg by mouth        * nicotine 14 MG/24HR 24 hr patch    NICODERM CQ    30 patch    Place 1 patch onto the skin every 24 hours    Cigarette nicotine dependence without complication       * nicotine 10 MG Inhaler    NICOTROL    168 each    Inhale 6-16 cartridge into the lungs daily as needed for smoking cessation    Cigarette nicotine dependence without complication       order for DME     1 each    Abdominal Binder - Medium    Ventral incisional hernia without obstruction or gangrene       traMADol 50 MG tablet    ULTRAM    30 tablet    Take 1 tablet (50 mg) by mouth every 8 hours as needed for moderate pain    Displacement of lumbar intervertebral disc without myelopathy       VISTARIL PO      Take 50 mg by mouth as needed for itching        * Notice:  This list has 2 medication(s) that are the same as other medications prescribed for you. Read the directions carefully, and ask your doctor or other care provider to review them with you.

## 2017-07-11 NOTE — PATIENT INSTRUCTIONS
[facility] Medication Refill Request Information:  * Please contact your pharmacy regarding ANY request for medication refills.  ** [facility] Prescription Fax = [phone]  * Please allow 3 business days for routine medication refills.  * Please allow 5 business days for controlled substance medication refills.     [facility] Test Result notification information:  *You will be notified with in 7-10 days of your appointment day regarding the results of your test.  If you are on MyChart you will be notified as soon as the provider has reviewed the results and signed off on them.    [facility] [phone]

## 2017-07-13 ENCOUNTER — TELEPHONE (OUTPATIENT)
Dept: INTERNAL MEDICINE | Facility: CLINIC | Age: 37
End: 2017-07-13

## 2017-07-13 NOTE — TELEPHONE ENCOUNTER
Pharmacy was informed.    Soon-Mi  ---------------------------------------        Dr. Aranda & Dr. Dixon    Re: diclofenac (VOLTAREN) 1 % GEL topical gel    [facility] reports patient has allergy to toradol, where she has shortness of breath, its possible she can get it with this, wants to make sure it is okay.  Please advise. Thanks.    Soon-Mi    I would not anticipate any problems with this topical medication.  JL

## 2017-07-17 LAB — PAIN DRUG SCR UR W RPTD MEDS: NORMAL

## 2017-07-20 ENCOUNTER — OFFICE VISIT (OUTPATIENT)
Dept: SURGERY | Facility: CLINIC | Age: 37
End: 2017-07-20
Payer: MEDICARE

## 2017-07-20 VITALS
SYSTOLIC BLOOD PRESSURE: 118 MMHG | BODY MASS INDEX: 33.32 KG/M2 | OXYGEN SATURATION: 97 % | WEIGHT: 200 LBS | DIASTOLIC BLOOD PRESSURE: 72 MMHG | HEIGHT: 65 IN | HEART RATE: 92 BPM

## 2017-07-20 DIAGNOSIS — K43.2 INCISIONAL HERNIA, WITHOUT OBSTRUCTION OR GANGRENE: Primary | ICD-10-CM

## 2017-07-20 PROCEDURE — 99207 ZZC NO CHARGE LOS: CPT | Performed by: SURGERY

## 2017-07-20 NOTE — PROGRESS NOTES
HPI      ROS (Review of Systems):      Positive for back injury.   MUSCULOSKELETAL: Positive for back pain.          Physical Exam

## 2017-07-20 NOTE — PROGRESS NOTES
Pt seen and examined, chart reviewed, [place] [facility] [place]  35 yo female who sustained a significant fall last summer with lumbar fracture requiring fixation via left flank incision, now with large (9cm) lumbar hernia containing bowel and kidney by report.  I relayed to her that I do not repair this type of hernia and as she has already established care with a surgeon at [facility] who is capable of fixing her defect after an appropriate time interval, I advised that she continue under his care.  I entered a no charge level of service as her problem is not one I or my partners will be able to help her with.

## 2017-07-20 NOTE — MR AVS SNAPSHOT
"              After Visit Summary   7/20/2017    Melba Cornelius    MRN: [id number]           Patient Information     Date Of Birth          1980        Visit Information        Provider Department      7/20/2017 11:15 AM Naren Bernal MD [facility] Hern[facility]      Today's Diagnoses     Incisional hernia, without obstruction or gangrene    -  1       Follow-ups after your visit        Your next 10 appointments already scheduled     Oct 18, 2017 12:50 PM CDT   (Arrive by 12:35 PM)   New Patient Visit with Mariya Valenzuela MD   [facility] ([facility])    [address]  4th Floor  [facility] [address] [phone]              Who to contact     If you have questions or need follow up information about today's clinic visit or your schedule please contact [facility] directly at [phone].  Normal or non-critical lab and imaging results will be communicated to you by MyChart, letter or phone within 4 business days after the clinic has received the results. If you do not hear from us within 7 days, please contact the clinic through [employer] or phone. If you have a critical or abnormal lab result, we will notify you by phone as soon as possible.  Submit refill requests through [employer] or call your pharmacy and they will forward the refill request to us. Please allow 3 business days for your refill to be completed.          Additional Information About Your Visit        [employer] Information     [employer] lets you send messages to your doctor, view your test results, renew your prescriptions, schedule appointments and more. To sign up, go to [email or web address] . Click on \"Log in\" on the left side of the screen, which will take you to the Welcome page. Then click on \"Sign up Now\" on the right side of the page.     You will be asked to enter the access code " "listed below, as well as some personal information. Please follow the directions to create your username and password.     Your access code is: [email or web address]  Expires: 2017  3:13 PM     Your access code will  in 90 days. If you need help or a new code, please call your [facility] clinic or [phone].        Care EveryWhere ID     This is your Care EveryWhere ID. This could be used by other organizations to access your [facility] medical records  [id number]        Your Vitals Were     Pulse Height Pulse Oximetry Breastfeeding? BMI (Body Mass Index)       92 5' 5\" (1.651 m) 97% No 33.28 kg/m2        Blood Pressure from Last 3 Encounters:   17 118/72   17 (P) 106/77   17 113/84    Weight from Last 3 Encounters:   17 200 lb (90.7 kg)   17 (P) 203 lb 1.6 oz (92.1 kg)   17 197 lb 3.2 oz (89.4 kg)              Today, you had the following     No orders found for display       Primary Care Provider    None Specified       No primary provider on file.        Equal Access to Services     [facility] : Hadii faviola Begum, walorraineda theresa, qaybta kaalmada fidelia, chase oleary . So [facility] [phone].    ATENCIÓN: Si habla español, tiene a harris disposición servicios gratuitos de asistencia lingüística. [facility] [phone].    We comply with applicable federal civil rights laws and [place] laws. We do not discriminate on the basis of race, color, national origin, age, disability sex, sexual orientation or gender identity.            Thank you!     Thank you for choosing [facility]  for your care. Our goal is always to provide you with excellent care. Hearing back from our patients is one way we can continue to improve our services. Please take a few minutes to complete the written survey that you may receive in the mail after your visit with us. Thank you!             Your Updated Medication List - Protect others around " you: Learn how to safely use, store and throw away your medicines at www.disposemymeds.org.          This list is accurate as of: 7/20/17 12:30 PM.  Always use your most recent med list.                   Brand Name Dispense Instructions for use Diagnosis    ARIPiprazole 10 MG tablet    ABILIFY     Take 10 mg by mouth        BUSPAR PO      Take 10 mg by mouth 3 times daily        CITALOPRAM HYDROBROMIDE PO      Take 20 mg by mouth        diclofenac 1 % Gel topical gel    VOLTAREN    100 g    Apply 2-4 grams to left ankle/foot up to 4 times daily as needed    Left foot pain       hydrOXYzine 50 MG tablet    ATARAX     Take 50 mg by mouth        * nicotine 14 MG/24HR 24 hr patch    NICODERM CQ    30 patch    Place 1 patch onto the skin every 24 hours    Cigarette nicotine dependence without complication       * nicotine 10 MG Inhaler    NICOTROL    168 each    Inhale 6-16 cartridge into the lungs daily as needed for smoking cessation    Cigarette nicotine dependence without complication       order for DME     1 each    Abdominal Binder - Medium    Ventral incisional hernia without obstruction or gangrene       traMADol 50 MG tablet    ULTRAM    30 tablet    Take 1 tablet (50 mg) by mouth every 8 hours as needed for moderate pain    Displacement of lumbar intervertebral disc without myelopathy       VISTARIL PO      Take 50 mg by mouth as needed for itching        * Notice:  This list has 2 medication(s) that are the same as other medications prescribed for you. Read the directions carefully, and ask your doctor or other care provider to review them with you.

## 2017-07-25 NOTE — PROGRESS NOTES
"Melba Cornelius MRN# [id number]   YOB: 1980 Age: 36 year old     Date: July 11, 2017     Reason for visit: follow-up, pain     HPI:   Ms. Melba Cornelius is a 36 year old female with pmhx polysubstance abuse, bipolar disorder, and suicide attempts resulting in numerous orthopedic injuries including significant injury to left ankle 06/2016 who presents today for follow-up.    Patient has been followed by Dr. Black, who has been working with patient on tobacco cessation, getting established with MH provider and on medications for MH diagnoses, as well as establishing with physical therapy.    Melba reports that she has continued to work no quitting smoking and is down to 2-3 cigarettes/day. She has been using the nicotine inhaler and lozenges and thinks they are working well.  She notes that quitting smoking is \"one of the hardest thing I've ever done\" but is overall pleased with the progress she has made. Unfortunately, d/t substance abuse and MH history she does not have any other options to help with quitting.     In terms of MH, patient has established with a psychiatrist at an outside facility (through [facility] via her report) and has had 2 visits so far, next visit tomorrow. Patient states that she is back on medications and has been taking them regularly, which her boyfriend confirms. States that she likes her MH provider and feels that they can establish a good relationship moving forward.    Regarding pain management, patient has been seeing PT, but states that pain often limits her ability to participate ane she experiences significant pain after appointments. However, she has managed to go at least 1-2x/week for past 3 weeks. Congratulated her on her commitment and encouraged her to continue to go as this was the key to recovery from her ankle injury. Patient also requesting Tramadol today. Had discussed previously with Dr. Black, but had not yet been prescribed as at that point Melba had not " followed through with several criteria Dr. Black had laid out, which included establishing with a psychiatrist, going to PT, working on quitting smoking.  Of note, patient seen by sports medicine provider several days ago and asked for Tramadol, gave her a 30 tablet supply that ran out in ~15 days.     Last concern today is regarding hernia. Patient previously seen in gen surg clinic and advised that she needed to quit smoking first and be off cigarettes for 6 months. She states that the hernia is quite uncomfortable for her and feels this is an unreasonable request as she is making effort to cut down.      Patient Active Problem List   Diagnosis     Intractable migraine     Depressive disorder, not elsewhere classified     Tobacco use     Auditory hallucinations     Acne     CARDIOVASCULAR SCREENING; LDL GOAL LESS THAN 160     Pain in joint involving ankle and foot, left     Abdominal wall hernia     Attention deficit disorder     Panic disorder with agoraphobia     Alcohol abuse     Amphetamine abuse     Amphetamine dependence (H)     Bipolar affective disorder (H)     Bipolar I disorder (H)     Borderline personality disorder     Nondependent cannabis abuse, episodic     Cannabis dependence (H)     Closed head injury     Closed pilon fracture of tibia     Concussion injury of body structure     Seizure (H)     Delusions (H)     Depression     Drug-seeking behavior     Foreign body in stomach     Generalized anxiety disorder     Headache     Fracture of calcaneus     History of suicide attempt     History of manic depressive disorder     History of migraine headaches     Homeless     Intractable migraine     Other long term (current) drug therapy     Cannabis abuse     Methadone misuse     Mixed, or nondependent drug abuse     Noncompliance with treatment     Opioid abuse     Opioid dependence (H)     Cervical syndrome     Delusional disorder (H)     Personality disorder     Polysubstance abuse     Posttraumatic  stress disorder     Psychostimulant dependence in remission (H)     Self-destructive behavior     Bipolar I disorder, most recent episode (or current) depressed, severe, specified as with psychotic behavior (H)     Other stimulant dependence, uncomplicated (H)     Spells of decreased attentiveness     Tobacco dependence syndrome       ROS: Complete 10 point review of systems negative unless mentioned in HPI    Exam:  BP (P) 106/77  Pulse (P) 87  Resp (P) 16  Wt (P) 92.1 kg (203 lb 1.6 oz)  BMI (P) 33.8 kg/m2  General: NAD, pleasant and cooperative with interview and exam  HEENT: NCAT, moist oral mucosa, oropharynx clear, anicteric sclera  Neck: no cervical lymphadenopathy or thyromegaly appreciated  CV: RRR, Normal S1, S2, no murmurs, rubs, thrills  Resp: non-labored respirations, CTAB, no rhonchi, crackles, wheezes appreciated  Abdomen: soft, nontender, nondistended, normoactive bs, no HSM  Extremities: WWP, no LE edema bilaterally  Skin: Warm and dry, no jaundice, rash, or concerning lesions  Neurological: alert and oriented, language fluent, no dysarthria, no gait abnormalities appreciated.  Psychological: appropriate mood     Labs:     Imaging:     A&P:   Ms. Melba Cornelius is a 36 year old female with pmhx polysubstance abuse, bipolar disorder, and suicide attempts resulting in numerous orthopedic injuries including significant injury to left ankle 06/2016 who presents today for follow-up.    #  Bipolar Disorder  #  Multiple Suicide Attempt  Patient reports that she has fully established with a MH team outside [facility] and has had two visits thus far. Has been compliant with medications for past 2 weeks. States that mood is slowly improving. Slightly tearful talking about frustrations with PT and chronic pain, but states that she knows she has made a lot of progress in the last year. States that still has periods of depression with low energy but becoming less severe. Denies suicidal ideation currently.   --  Encouraged patient to continue following with establish  provider; given extensive MH history and frequent periods of SI and instability, will be very important for her to remain plugged into  moving forward with her care  -- Pt to get documentation from MH provider at next visit and bring to clinic next week    #  Chronic Pain  # Hx Left Ankle Injury   Patient with chronic pain from numerous injuries particularly left ankle injury.  Had initially been treated by Dr. Black with Tramadol, but patient then did not follow up with recommendations and therefore no additional pain medications prescribed. On presentation today, patient has now completed all of the follow up that Dr. Black requested of her, including establishing care with MH, establishing care with PT, going to gen surg consult, and is down to 2 cigarettes daily. Discussed with patient that if she is able to pass UDS today and can bring in documentation of MH visit to clinic next week, I am willing to begin prescribing Tramadol monthly as part of a pain contract. Patient agreeable tot his  -- UDS today  -- F/u in 1 week, will bring  paperwork in  -- If UDS clean and  paperwork in order will develop pain contract and prescribe Tramadol monthly per pain contract provided patient continues with current plan    #  Incisional Hernia  Patient with incisional hernia that is very uncomfortable for her. Was seen by general surgeon at [facility] who requested pt be off cigarettes for 6 months before he would operate. Melba does not feel this is a fair compromise and would like to see a different surgeon  -- Will reach out to surgery clinic regarding requirements for surgery and follow up with Melba next week     # Tobacco Cessation:   Continues to wean down number of cigarettes daily, down to 2-3/day. Continues to use nicotine lozenges and inhalers. Congratulated Melba on her significant progress voer the past few weeks.     Return to clinic: 1 week     Patient  care plan discussed with attending physician, Dr. Aranda, who agreed with above.    Valentina Dixon MD  Internal Medicine, PGY-3  Pt was seen and examined with Dr. Dixon.  I agree with her documentation as noted above.    My additional comments: None    Toy Aranda

## 2017-08-29 ENCOUNTER — PRE VISIT (OUTPATIENT)
Dept: ANESTHESIOLOGY | Facility: CLINIC | Age: 37
End: 2017-08-29

## 2017-08-29 NOTE — TELEPHONE ENCOUNTER
1.  Date/reason for appt: 9/11/17 7AM Chronic pain of left ankle, Displacement of lumbar intervertebral disc without myelopathy,  2.  Referring provider: Dr. Khan  3.  Call to patient (Yes / No - short description): No, pt was referred.   4.  Previous care at / records requested from: [facility] -- Recs are in Epic / Images in PACS

## 2018-10-29 ENCOUNTER — THERAPY VISIT (OUTPATIENT)
Dept: PHYSICAL THERAPY | Facility: CLINIC | Age: 38
End: 2018-10-29
Payer: MEDICARE

## 2018-10-29 DIAGNOSIS — G89.29 CHRONIC PAIN OF LEFT ANKLE: Primary | ICD-10-CM

## 2018-10-29 DIAGNOSIS — M25.572 CHRONIC PAIN OF LEFT ANKLE: Primary | ICD-10-CM

## 2018-10-29 PROCEDURE — G8979 MOBILITY GOAL STATUS: HCPCS | Mod: GP | Performed by: PHYSICAL THERAPIST

## 2018-10-29 PROCEDURE — G8978 MOBILITY CURRENT STATUS: HCPCS | Mod: GP | Performed by: PHYSICAL THERAPIST

## 2018-10-29 PROCEDURE — 97163 PT EVAL HIGH COMPLEX 45 MIN: CPT | Mod: GP | Performed by: PHYSICAL THERAPIST

## 2018-10-29 NOTE — LETTER
DEPARTMENT OF HEALTH AND HUMAN SERVICES  CENTERS FOR MEDICARE & MEDICAID SERVICES    PLAN/UPDATED PLAN OF PROGRESS FOR OUTPATIENT REHABILITATION    PATIENTS NAME:  Melba Cornelius   : 1980  PROVIDER NUMBER:    [id number]  [id number]   PROVIDER NAME: [place] [facility] PHYSICAL THERAPY  MEDICAL RECORD NUMBER: [id number]   START OF CARE DATE:  SOC Date: 10/29/18   TYPE:  PT    PRIMARY/TREATMENT DIAGNOSIS: (Pertinent Medical Diagnosis)  Chronic pain of left ankle    VISITS FROM START OF CARE:  Rxs Used: 1     [place] [facility] Initial Evaluation  Subjective:  Patient is a 38 year old female presenting with rehab left ankle/foot hpi. The history is provided by the patient. No  was used.   Melba Cornelius is a 38 year old female with a left ankle and left foot condition.  Condition occurred with:  A wrong landing.  Condition occurred: in the community.  This is a chronic condition  In  she jumped into a 50 foot deep hole and fractured her L ankle as well as 3 vertebrae. She had surgery on L ankle placing 16 pins and 2 plates. She did have therapy. She then had Great toe surgery in 2017 for bone spur and again had PT. She has not been seen in PT or by MD in 11 months. She shows up today hoping to try ASTYM for ankle and foot. She was sent to MD next door 10/29/18 for evaluation before we start treatment.    Patient reports pain:  Anterior, great toe, joint and lateral.  Radiates to:  Ankle and foot.  Pain is described as aching and sharp and is intermittent   Associated symptoms:  Tingling, buckling/giving out, edema, loss of motion/stiffness, loss of strength and numbness. Pain is worse during the day.  Symptoms are exacerbated by activity, ascending stairs, bending/squatting, standing, descending stairs, walking and weight bearing and relieved by rest.  Since onset symptoms are gradually worsening.  Special tests:  X-ray.  Previous treatment includes  physical therapy and surgery.  There was mild improvement following previous treatment.  General health as reported by patient is fair.  Pertinent medical history includes:  Depression, history of fractures, numbness/tingling, osteoarthritis, overweight, mental illness and concussions/dizziness.  Medical allergies: yes.  Other surgeries include:  Orthopedic surgery.  Current medications:  None as reported by patient.  Current occupation is Not working.          Red flags:  Pain at night/rest and significant weakness.         Objective:  Gait:    Gait Type:  Antalgic   Assistive Devices:  None  Deviations:  Ankle:  Heel strike decr L and push off decr LGeneral Deviations:  Toe out L and stride length decr    Ankle/Foot Evaluation  ROM:    AROM:    Dorsiflexion:  Left:   7  Right:   12  Plantarflexion:  Left:  45    Right:  65  Inversion:  Left:  30     Right:  50  Eversion:  0     Right:  12  Great Toe Extension:  Left:  0     Right: 45  PROM:    Great Toe Extension:  Left:    0     Right: 65  Strength:    Dorsiflexion:  Left: 4/5     Right: 5/5     Plantarflexion: Left: 3+/5    Pain:+++     Inversion:Left: 4-/5    Eversion:Left: 4-/5    PATIENTS NAME:  Melba Cornelius   : 1980  LIGAMENT TESTING: not assessed  SPECIAL TESTS: not assessed  EDEMA:   Left ankle edema present at: forefoot  MOBILITY TESTING:   Talocrural Left: hypomobile      Subtalar Left: hypomobile      Midtarsal Left: hypomobile      First Ray Left: hypomobile      FUNCTIONAL TESTS: not assessed    Assessment/Plan:    Patient is a 38 year old female with left side ankle complaints.    Patient has the following significant findings with corresponding treatment plan.                Diagnosis 1:  L ankle fracture with ORIF along with s/p Great toe surgery L  Pain -  manual therapy, self management, education and home program  Decreased ROM/flexibility - manual therapy and therapeutic exercise  Decreased joint mobility - manual therapy and therapeutic  exercise  Decreased strength - therapeutic exercise and therapeutic activities  Impaired balance - neuro re-education and therapeutic activities  Decreased proprioception - neuro re-education and therapeutic activities  Inflammation - self management/home program  Edema - self management/home program  Impaired gait - gait training  Impaired muscle performance - neuro re-education  Decreased function - therapeutic activities    Therapy Evaluation Codes:   1) History comprised of:   Personal factors that impact the plan of care:      Anxiety, Coping style, Overall behavior pattern, Past/current experiences, Social history/culture and Time since onset of symptoms.    Comorbidity factors that impact the plan of care are:      Concussion, Depression, Mental illness and Overweight.     Medications impacting care: None.  2) Examination of Body Systems comprised of:   Body structures and functions that impact the plan of care:      Ankle.   Activity limitations that impact the plan of care are:      Jumping, Running, Squatting/kneeling, Stairs, Standing and Walking.  3) Clinical presentation characteristics are:   Unstable/Unpredictable.  4) Decision-Making    High complexity using standardized patient assessment instrument and/or measureable assessment of functional outcome.  Cumulative Therapy Evaluation is: High complexity.  Previous and current functional limitations:  (See Goal Flow Sheet for this information)    Short term and Long term goals: (See Goal Flow Sheet for this information)     Communication ability:  Patient appears to be able to clearly communicate and understand verbal and written communication and follow directions correctly.  Treatment Explanation - The following has been discussed with the patient:     RX ordered/plan of care  Anticipated outcomes  Possible risks and side effects  This patient would benefit from PT intervention to resume normal activities.   Rehab potential is good.    Frequency:  1  "X week, once daily  Duration:  for 12 weeks  Discharge Plan:  Achieve all LTG.  Independent in home treatment program.  Reach maximal therapeutic benefit.      Caregiver Signature/Credentials _____________________________ Date ________      Treating Provider: Darwin Eason PT   I have reviewed and certified the need for these services and plan of treatment while under my care.        PHYSICIAN'S SIGNATURE:   _________________________________________  Date___________    Keisha Onofre MD    Certification period:  Beginning of Cert date period: 10/29/18 to  End of Cert period date: 01/26/19     Functional Level Progress Report: Please see attached \"Goal Flow sheet for Functional level.\"    ____X____ Continue Services or       ________ DC Services                Service dates: From  SOC Date: 10/29/18 date to present                         "

## 2018-10-29 NOTE — MR AVS SNAPSHOT
"              After Visit Summary   10/29/2018    Melba Cornelius    MRN: [id number]           Patient Information     Date Of Birth          1980        Visit Information        Provider Department      10/29/2018 3:10 PM Jenaro Pt, FIFI [facility] Physical Therapy        Today's Diagnoses     Chronic pain of left ankle    -  1       Follow-ups after your visit        Your next 10 appointments already scheduled     Oct 30, 2018 10:00 AM CDT   Office Visit with Keisha Onofre MD   [facility] ([facility])    [address] [address] [phone]           Bring a current list of meds and any records pertaining to this visit. For Physicals, please bring immunization records and any forms needing to be filled out. Please arrive 10 minutes early to complete paperwork.              Who to contact     If you have questions or need follow up information about today's clinic visit or your schedule please contact [facility] PHYSICAL THERAPY directly at [phone].  Normal or non-critical lab and imaging results will be communicated to you by [employer], letter or phone within 4 business days after the clinic has received the results. If you do not hear from us within 7 days, please contact the clinic through [employer] or phone. If you have a critical or abnormal lab result, we will notify you by phone as soon as possible.  Submit refill requests through [employer] or call your pharmacy and they will forward the refill request to us. Please allow 3 business days for your refill to be completed.          Additional Information About Your Visit        [employer] Information     [employer] lets you send messages to your doctor, view your test results, renew your prescriptions, schedule appointments and more. To sign up, go to [email or web address] . Click on \"Log in\" on the left side of the screen, which will " "take you to the Welcome page. Then click on \"Sign up Now\" on the right side of the page.     You will be asked to enter the access code listed below, as well as some personal information. Please follow the directions to create your username and password.     Your access code is: [email or web address]  Expires: 2019  4:07 PM     Your access code will  in 90 days. If you need help or a new code, please call your [place] clinic or [phone].        Care EveryWhere ID     This is your Care EveryWhere ID. This could be used by other organizations to access your [place] medical records  [id number]         Blood Pressure from Last 3 Encounters:   17 118/72   17 (P) 106/77   17 113/84    Weight from Last 3 Encounters:   17 90.7 kg (200 lb)   17 (P) 92.1 kg (203 lb 1.6 oz)   17 89.4 kg (197 lb 3.2 oz)              We Performed the Following     HC PT EVAL, HIGH COMPLEXITY     [facility] CERT REPORT        Primary Care Provider Fax #    Physician No Ref-Primary [phone]       No address on file        Equal Access to Services     MILA THEODORE : Angela Begum, walorraineda lujennaadaha, qaybta kaalmada adewalterda, chase oleary . So [facility] [phone].    ATENCIÓN: Si habla español, tiene a harris disposición servicios gratuitos de asistencia lingüística. Llame al [phone].    We comply with applicable federal civil rights laws and [place] laws. We do not discriminate on the basis of race, color, national origin, age, disability, sex, sexual orientation, or gender identity.            Thank you!     Thank you for choosing [facility] PHYSICAL THERAP[facility]  for your care. Our goal is always to provide you with excellent care. Hearing back from our patients is one way we can continue to improve our services. Please take a few minutes to complete the written survey that you may receive in the mail after your visit with us. Thank " you!             Your Updated Medication List - Protect others around you: Learn how to safely use, store and throw away your medicines at www.disposemymeds.org.          This list is accurate as of 10/29/18  4:07 PM.  Always use your most recent med list.                   Brand Name Dispense Instructions for use Diagnosis    ARIPiprazole 10 MG tablet    ABILIFY     Take 10 mg by mouth        BUSPAR PO      Take 10 mg by mouth 3 times daily        CITALOPRAM HYDROBROMIDE PO      Take 20 mg by mouth        diclofenac 1 % Gel topical gel    VOLTAREN    100 g    Apply 2-4 grams to left ankle/foot up to 4 times daily as needed    Left foot pain       hydrOXYzine 50 MG tablet    ATARAX     Take 50 mg by mouth        * nicotine 14 MG/24HR 24 hr patch    NICODERM CQ    30 patch    Place 1 patch onto the skin every 24 hours    Cigarette nicotine dependence without complication       * nicotine 10 MG Inhaler    NICOTROL    168 each    Inhale 6-16 cartridge into the lungs daily as needed for smoking cessation    Cigarette nicotine dependence without complication       order for DME     1 each    Abdominal Binder - Medium    Ventral incisional hernia without obstruction or gangrene       traMADol 50 MG tablet    ULTRAM    30 tablet    Take 1 tablet (50 mg) by mouth every 8 hours as needed for moderate pain    Displacement of lumbar intervertebral disc without myelopathy       VISTARIL PO      Take 50 mg by mouth as needed for itching        * Notice:  This list has 2 medication(s) that are the same as other medications prescribed for you. Read the directions carefully, and ask your doctor or other care provider to review them with you.

## 2018-10-30 ENCOUNTER — RADIANT APPOINTMENT (OUTPATIENT)
Dept: GENERAL RADIOLOGY | Facility: CLINIC | Age: 38
End: 2018-10-30
Attending: FAMILY MEDICINE
Payer: MEDICARE

## 2018-10-30 ENCOUNTER — OFFICE VISIT (OUTPATIENT)
Dept: FAMILY MEDICINE | Facility: CLINIC | Age: 38
End: 2018-10-30
Payer: MEDICARE

## 2018-10-30 VITALS
BODY MASS INDEX: 34.84 KG/M2 | OXYGEN SATURATION: 97 % | DIASTOLIC BLOOD PRESSURE: 86 MMHG | SYSTOLIC BLOOD PRESSURE: 120 MMHG | HEIGHT: 65 IN | TEMPERATURE: 98.5 F | HEART RATE: 88 BPM | WEIGHT: 209.1 LBS

## 2018-10-30 DIAGNOSIS — R42 DIZZINESS: ICD-10-CM

## 2018-10-30 DIAGNOSIS — M25.572 PAIN IN JOINT, ANKLE AND FOOT, LEFT: Primary | ICD-10-CM

## 2018-10-30 DIAGNOSIS — S06.9X0A TRAUMATIC BRAIN INJURY, WITHOUT LOSS OF CONSCIOUSNESS, INITIAL ENCOUNTER (H): ICD-10-CM

## 2018-10-30 DIAGNOSIS — F31.77 BIPOLAR DISORDER, IN PARTIAL REMISSION, MOST RECENT EPISODE MIXED (H): ICD-10-CM

## 2018-10-30 LAB
ANION GAP SERPL CALCULATED.3IONS-SCNC: 8 MMOL/L (ref 3–14)
BUN SERPL-MCNC: 9 MG/DL (ref 7–30)
CALCIUM SERPL-MCNC: 9.1 MG/DL (ref 8.5–10.1)
CHLORIDE SERPL-SCNC: 104 MMOL/L (ref 94–109)
CO2 SERPL-SCNC: 26 MMOL/L (ref 20–32)
CREAT SERPL-MCNC: 0.76 MG/DL (ref 0.52–1.04)
GFR SERPL CREATININE-BSD FRML MDRD: 85 ML/MIN/1.7M2
GLUCOSE SERPL-MCNC: 87 MG/DL (ref 70–99)
POTASSIUM SERPL-SCNC: 4.3 MMOL/L (ref 3.4–5.3)
SODIUM SERPL-SCNC: 138 MMOL/L (ref 133–144)
T4 FREE SERPL-MCNC: 1.17 NG/DL (ref 0.76–1.46)
TSH SERPL DL<=0.005 MIU/L-ACNC: 4.21 MU/L (ref 0.4–4)

## 2018-10-30 PROCEDURE — 84439 ASSAY OF FREE THYROXINE: CPT | Performed by: FAMILY MEDICINE

## 2018-10-30 PROCEDURE — 73610 X-RAY EXAM OF ANKLE: CPT | Mod: LT

## 2018-10-30 PROCEDURE — 99203 OFFICE O/P NEW LOW 30 MIN: CPT | Performed by: FAMILY MEDICINE

## 2018-10-30 PROCEDURE — 80048 BASIC METABOLIC PNL TOTAL CA: CPT | Performed by: FAMILY MEDICINE

## 2018-10-30 PROCEDURE — 84443 ASSAY THYROID STIM HORMONE: CPT | Performed by: FAMILY MEDICINE

## 2018-10-30 PROCEDURE — 36415 COLL VENOUS BLD VENIPUNCTURE: CPT | Performed by: FAMILY MEDICINE

## 2018-10-30 RX ORDER — ARIPIPRAZOLE 5 MG/1
10 TABLET ORAL DAILY
Refills: 0 | COMMUNITY
Start: 2018-10-21 | End: 2021-04-01

## 2018-10-30 RX ORDER — DEXTROAMPHETAMINE SACCHARATE, AMPHETAMINE ASPARTATE MONOHYDRATE, DEXTROAMPHETAMINE SULFATE AND AMPHETAMINE SULFATE 3.75; 3.75; 3.75; 3.75 MG/1; MG/1; MG/1; MG/1
CAPSULE, EXTENDED RELEASE ORAL
Refills: 0 | COMMUNITY
Start: 2018-08-03 | End: 2019-01-08

## 2018-10-30 ASSESSMENT — ANXIETY QUESTIONNAIRES
5. BEING SO RESTLESS THAT IT IS HARD TO SIT STILL: NOT AT ALL
6. BECOMING EASILY ANNOYED OR IRRITABLE: SEVERAL DAYS
2. NOT BEING ABLE TO STOP OR CONTROL WORRYING: NOT AT ALL
1. FEELING NERVOUS, ANXIOUS, OR ON EDGE: SEVERAL DAYS
7. FEELING AFRAID AS IF SOMETHING AWFUL MIGHT HAPPEN: NOT AT ALL
GAD7 TOTAL SCORE: 3
IF YOU CHECKED OFF ANY PROBLEMS ON THIS QUESTIONNAIRE, HOW DIFFICULT HAVE THESE PROBLEMS MADE IT FOR YOU TO DO YOUR WORK, TAKE CARE OF THINGS AT HOME, OR GET ALONG WITH OTHER PEOPLE: SOMEWHAT DIFFICULT
3. WORRYING TOO MUCH ABOUT DIFFERENT THINGS: NOT AT ALL

## 2018-10-30 ASSESSMENT — PATIENT HEALTH QUESTIONNAIRE - PHQ9
SUM OF ALL RESPONSES TO PHQ QUESTIONS 1-9: 0
5. POOR APPETITE OR OVEREATING: SEVERAL DAYS

## 2018-10-30 NOTE — MR AVS SNAPSHOT
After Visit Summary   10/30/2018    Melba Cornelius    MRN: [id number]           Patient Information     Date Of Birth          1980        Visit Information        Provider Department      10/30/2018 10:00 AM Keisha Onofre MD [facility]        Today's Diagnoses     Pain in joint, ankle and foot, left    -  1    Dizziness        Pre-syncope        Traumatic brain injury, without loss of consciousness, initial encounter (H)           Follow-ups after your visit        Additional Services     CONCUSSION  REFERRAL       You have been referred to [facility]  [facility].    The  Representative will assist you in the coordination of your concussion care as prescribed by your provider.    The  Representative will contact you within one business day, or you may contact the  Representative at [phone].    Referral Options:  Non-Sports related concussion management    Coverage of these services are subject to the terms and limitations of your health insurance plan.  Please call member services at your health plan with any benefit or coverage questions.     If X-rays, CT or MRI's have been performed, please contact the facility where they were done, to arrange for  prior to your scheduled appointment.  Please bring this referral request to your appointment and present it to your specialist.            [facility] PT, HAND, AND CHIROPRACTIC REFERRAL       Physical Therapy, Hand Therapy and Chiropractic Care are available through:  *[facility]  *Hand Therapy (Occupational Therapy or Physical Therapy)  *[facility]    Call one number to schedule at any of the above locations: [phone].    Physical therapy, Hand therapy and/or Chiropractic care has been recommended by your physician as an excellent treatment option to reduce pain and help people return to normal activities, including  "sports.  Therapy and/or chiropractic care services are a great complement or alternative to expensive and invasive surgery, injections, or long-term use of prescription medications. The primary goal is to identify the underlying problem and provide you the tools to manage your condition on your own.     Please be aware that coverage of these services is subject to the terms and limitations of your health insurance plan.  Call member services at your health plan with any benefit or coverage questions.      Please bring the following to your appointment:  *Your personal calendar for scheduling future appointments  *Comfortable clothing                  Follow-up notes from your care team     Return in about 1 week (around 11/6/2018).      Future tests that were ordered for you today     Open Future Orders        Priority Expected Expires Ordered    [facility] PT, HAND, AND CHIROPRACTIC REFERRAL Routine  10/30/2019 10/30/2018            Who to contact     If you have questions or need follow up information about today's clinic visit or your schedule please contact [facility] directly at [phone].  Normal or non-critical lab and imaging results will be communicated to you by [employer], letter or phone within 4 business days after the clinic has received the results. If you do not hear from us within 7 days, please contact the clinic through [employer] or phone. If you have a critical or abnormal lab result, we will notify you by phone as soon as possible.  Submit refill requests through [employer] or call your pharmacy and they will forward the refill request to us. Please allow 3 business days for your refill to be completed.          Additional Information About Your Visit        [employer] Information     [employer] lets you send messages to your doctor, view your test results, renew your prescriptions, schedule appointments and more. To sign up, go to [email or web address] . Click on \"Log in\" on the left side of the " "screen, which will take you to the Welcome page. Then click on \"Sign up Now\" on the right side of the page.     You will be asked to enter the access code listed below, as well as some personal information. Please follow the directions to create your username and password.     Your access code is: [email or web address]  Expires: 2019  4:07 PM     Your access code will  in 90 days. If you need help or a new code, please call your [place] clinic or [phone].        Care EveryWhere ID     This is your Care EveryWhere ID. This could be used by other organizations to access your [facility] medical records  [id number]        Your Vitals Were     Pulse Temperature Height Last Period Pulse Oximetry BMI (Body Mass Index)    88 98.5  F (36.9  C) (Oral) 5' 5\" (1.651 m) 2018 (Approximate) 97% 34.8 kg/m2       Blood Pressure from Last 3 Encounters:   10/30/18 120/86   17 118/72   17 (P) 106/77    Weight from Last 3 Encounters:   10/30/18 209 lb 1.6 oz (94.8 kg)   17 200 lb (90.7 kg)   17 (P) 203 lb 1.6 oz (92.1 kg)              We Performed the Following     CONCUSSION  REFERRAL     XR Ankle Left G/E 3 Views        Primary Care Provider Fax #    Physician No Ref-Primary [phone]       No address on file        Equal Access to Services     JOSETTE THEODORE : Hadii faviola ceballos hadasho Sosuzieali, waaxda luqadaha, qaybta kaalmada adeegyada, chase oleary . So [facility] [phone].    ATENCIÓN: Si habla español, tiene a harris disposición servicios gratuitos de asistencia lingüística. Llame al [phone].    We comply with applicable federal civil rights laws and [place] laws. We do not discriminate on the basis of race, color, national origin, age, disability, sex, sexual orientation, or gender identity.            Thank you!     Thank you for choosing [facility]  for your care. Our goal is always to provide you with excellent care. Hearing back from our " patients is one way we can continue to improve our services. Please take a few minutes to complete the written survey that you may receive in the mail after your visit with us. Thank you!             Your Updated Medication List - Protect others around you: Learn how to safely use, store and throw away your medicines at www.disposemymeds.org.          This list is accurate as of 10/30/18 10:40 AM.  Always use your most recent med list.                   Brand Name Dispense Instructions for use Diagnosis    amphetamine-dextroamphetamine 15 MG per 24 hr capsule    ADDERALL XR     TK 1 C PO ONCE D IN THE MORNING UP ON AWAKENING        ARIPiprazole 5 MG tablet    ABILIFY          CITALOPRAM HYDROBROMIDE PO      Take 20 mg by mouth        hydrOXYzine 50 MG tablet    ATARAX     Take 50 mg by mouth

## 2018-10-30 NOTE — PROGRESS NOTES
SUBJECTIVE:   Melba Cornelius is a 38 year old female who presents to clinic today for the following health issues:    Chief Complaint   Patient presents with     Establish Care     Referral     patient requesting referral to [facility] for physical therapy for LT ankle injury and to a TBI clinic for previous concussion     Patient presents to clinic to address multiple concerns.   She has left ankle pain. Pain has been present since 2016. The patient jumped into a deep hole and sustained fracture of the left ankle (underwent surgery and has  Metal plate and screws). She continues to have left ankle pain and intermittent swelling. Patient desires to establish care with physical therapy.     Also, She has episodes of dizziness. Reports a TBI in 2016.  Mental health disorders which includes bipolar disorder and depression.  She had a psychotic episode in 2016 and was hearing voices which were telling her to smash her head on the concrete.  Denies having intraparenchymal hemorrhage but did have bleeding from her uterus and nose.  Over the past 2 years, she has been having intermittent episodes of dizziness.  Dizziness is worse when she is driving in one direction with other objects driving in the different direction.  She denies any numbness or tingling.  Denies any nausea or vomiting.  Denies any headaches.    Problem list and histories reviewed & adjusted, as indicated.  Additional history: as documented    Patient Active Problem List   Diagnosis     Intractable migraine     Depressive disorder, not elsewhere classified     Tobacco use     Auditory hallucinations     Acne     CARDIOVASCULAR SCREENING; LDL GOAL LESS THAN 160     Abdominal wall hernia     Attention deficit disorder     Panic disorder with agoraphobia     Alcohol abuse     Amphetamine abuse (H)     Amphetamine dependence (H)     Bipolar affective disorder (H)     Bipolar I disorder (H)     Borderline personality disorder (H)     Nondependent cannabis abuse,  episodic     Cannabis dependence (H)     Closed head injury     Closed pilon fracture of tibia     Concussion injury of body structure     Seizure (H)     Delusions (H)     Depression     Drug-seeking behavior     Foreign body in stomach     Generalized anxiety disorder     Headache     History of suicide attempt     History of manic depressive disorder     History of migraine headaches     Homeless     Intractable migraine     Other long term (current) drug therapy     Cannabis abuse     Methadone misuse     Mixed, or nondependent drug abuse     Noncompliance with treatment     Opioid abuse (H)     Opioid dependence (H)     Cervical syndrome     Delusional disorder (H)     Personality disorder (H)     Polysubstance abuse (H)     Posttraumatic stress disorder     Psychostimulant dependence in remission (H)     Self-destructive behavior     Bipolar I disorder, most recent episode (or current) depressed, severe, specified as with psychotic behavior (H)     Other stimulant dependence, uncomplicated (H)     Spells of decreased attentiveness     Tobacco dependence syndrome     Chronic pain of left ankle     Past Surgical History:   Procedure Laterality Date     BACK SURGERY       C LT X-RAY HEEL      surgery     HC REMOVE TONSILS/ADENOIDS,14+ Y/O      T & A 14+y.o.     HC TOOTH EXTRACTION W/FORCEP         Social History   Substance Use Topics     Smoking status: Current Every Day Smoker     Smokeless tobacco: Never Used      Comment: TRYING TO QUIT     Alcohol use No     Family History   Problem Relation Age of Onset     Diabetes Maternal Aunt      HEART DISEASE Maternal Grandmother       at 55     Cancer No family hx of      Neurologic Disorder No family hx of            Reviewed and updated as needed this visit by clinical staff  Allergies  Meds       Reviewed and updated as needed this visit by Provider         ROS:  Constitutional, HEENT, cardiovascular, pulmonary, gi and gu systems are negative, except as  "otherwise noted.    OBJECTIVE:     /86  Pulse 88  Temp 98.5  F (36.9  C) (Oral)  Ht 5' 5\" (1.651 m)  Wt 209 lb 1.6 oz (94.8 kg)  LMP 09/14/2018 (Approximate)  SpO2 97%  BMI 34.8 kg/m2  Body mass index is 34.8 kg/(m^2).  GENERAL: healthy, alert and no distress  RESP: lungs clear to auscultation - no rales, rhonchi or wheezes  CV: regular rate and rhythm, normal S1 S2, no S3 or S4, no murmur, click or rub, no peripheral edema and peripheral pulses strong  MS: Restricted ROM in the left foot. Well healed incision site on the left lateral foot.     Diagnostic Test Results:  Results for orders placed or performed in visit on 10/30/18   XR Ankle Left G/E 3 Views    Narrative    LEFT ANKLE THREE OR MORE VIEWS  10/30/2018 12:43 PM      HISTORY: Pain in joint, ankle and foot, left.    COMPARISON: 6/8/2017.      Impression    IMPRESSION: No acute fracture or dislocation. Old postoperative  changes in the calcaneus.    AMBERLY DRAPER MD   TSH with free T4 reflex   Result Value Ref Range    TSH 4.21 (H) 0.40 - 4.00 mU/L   Basic metabolic panel   Result Value Ref Range    Sodium 138 133 - 144 mmol/L    Potassium 4.3 3.4 - 5.3 mmol/L    Chloride 104 94 - 109 mmol/L    Carbon Dioxide 26 20 - 32 mmol/L    Anion Gap 8 3 - 14 mmol/L    Glucose 87 70 - 99 mg/dL    Urea Nitrogen 9 7 - 30 mg/dL    Creatinine 0.76 0.52 - 1.04 mg/dL    GFR Estimate 85 >60 mL/min/1.7m2    GFR Estimate If Black >90 >60 mL/min/1.7m2    Calcium 9.1 8.5 - 10.1 mg/dL   T4 free   Result Value Ref Range    T4 Free 1.17 0.76 - 1.46 ng/dL       IMPRESSION:   No evidence of acute intracranial abnormality or mass.   No evidence of fracture or dislocation of the cervical spine.   Result Narrative     CT HEAD AND CERVICAL SPINE FOR TRAUMA    36 y.o. [place] w/ [place] found banging her head against a concrete wall w/ unknown LOC. Pt extremely restless and violently combative, per EMS. Pt arrives to the ED w/ significant " "swelling circumferentially to her   head and unable to provide additional information surrounding today's episode. Pt endorsing AH stating \"I hear my moms voice and I hear the Holy Ghost\" and \"I'm trying to hear people's voices\".    TECHNIQUE: Axial multidetector CT images were obtained from the level of C3 to the high convexity without contrast.  In addition, 0.625 millimeter thick axial multidetector CT images of the cervical spine were obtained followed by coronal reconstructions   without contrast.    COMPARISON:  None.    FINDINGS:        HEAD:    Intracranial structures: Sulci and ventricles are normal in size and configuration. There is no midline shift or mass effect. Gray-white matter differentiation is normal. There is no intracranial hemorrhage, mass or evidence of infarct.    Skull: No fracture is identified.    Scalp: Severe diffuse scalp confluent circumferential hematoma    Paranasal sinuses and mastoid air cells: Mild mucosal thickening in the inferior aspect of the right maxillary sinus.    Globes and orbits: No evidence of intraorbital traumatic injury.    CERVICAL SPINE:    Craniocervical junction is normal. The C1-C2 articulation is normal. The vertebral bodies maintain normal height and alignment. Disc spaces are normal. There is no evidence of spinal canal or neural foramina bony encroachment. Prevertebral soft tissues   are normal thickness. The paravertebral soft tissues are unremarkable. Right hemineck superficial catheter base with associated mild subcutaneous emphysema, likely related to instrumentation. The upper ribs are obscured by motion. Lung apices are   suboptimally evaluated due to respiratory motion without obvious abnormality. No evidence of pneumothorax.   Other Result Information   Interface, Radiology Results In - 12/29/2016  9:45 PM [facility]    CT HEAD AND CERVICAL SPINE FOR TRAUMA    36 y.o. female w/ [place] found banging her head against a " "concrete wall w/ unknown LOC. Pt extremely restless and violently combative, per EMS. Pt arrives to the ED w/ significant swelling circumferentially to her   head and unable to provide additional information surrounding today's episode. Pt endorsing AH stating \"I hear my moms voice and I hear the Holy Ghost\" and \"I'm trying to hear people's voices\".    TECHNIQUE: Axial multidetector CT images were obtained from the level of C3 to the high convexity without contrast.  In addition, 0.625 millimeter thick axial multidetector CT images of the cervical spine were obtained followed by coronal reconstructions   without contrast.    COMPARISON:  None.    FINDINGS:        HEAD:    Intracranial structures: Sulci and ventricles are normal in size and configuration. There is no midline shift or mass effect. Gray-white matter differentiation is normal. There is no intracranial hemorrhage, mass or evidence of infarct.    Skull: No fracture is identified.    Scalp: Severe diffuse scalp confluent circumferential hematoma    Paranasal sinuses and mastoid air cells: Mild mucosal thickening in the inferior aspect of the right maxillary sinus.    Globes and orbits: No evidence of intraorbital traumatic injury.    CERVICAL SPINE:    Craniocervical junction is normal. The C1-C2 articulation is normal. The vertebral bodies maintain normal height and alignment. Disc spaces are normal. There is no evidence of spinal canal or neural foramina bony encroachment. Prevertebral soft tissues   are normal thickness. The paravertebral soft tissues are unremarkable. Right hemineck superficial catheter base with associated mild subcutaneous emphysema, likely related to instrumentation. The upper ribs are obscured by motion. Lung apices are   suboptimally evaluated due to respiratory motion without obvious abnormality. No evidence of pneumothorax.    IMPRESSION:  IMPRESSION:   No evidence of acute intracranial abnormality or mass.   No evidence of " fracture or dislocation of the cervical spine.       ASSESSMENT/PLAN:       ICD-10-CM    1. Pain in joint, ankle and foot, left M25.572 XR Ankle Left G/E 3 Views     [facility] PT, HAND, AND CHIROPRACTIC REFERRAL     T4 free   2. Dizziness R42 CONCUSSION  REFERRAL     TSH with free T4 reflex     Basic metabolic panel   3. Traumatic brain injury, without loss of consciousness, initial encounter (H) S06.9X0A CONCUSSION  REFERRAL   4. Bipolar disorder, in partial remission, most recent episode mixed (H) F31.77 ARIPiprazole (ABILIFY) 5 MG tablet     TSH with free T4 reflex     Patient was advised to schedule a visit to establish care.     Keisha Onofre MD  [facility]

## 2018-10-30 NOTE — NURSING NOTE
"Chief Complaint   Patient presents with     Establish Care     Referral     patient requesting referral to [facility] for physical therapy for LT ankle injury and to a TBI clinic for previous concussion      /86  Pulse 88  Temp 98.5  F (36.9  C) (Oral)  Ht 5' 5\" (1.651 m)  Wt 209 lb 1.6 oz (94.8 kg)  LMP 09/14/2018 (Approximate)  SpO2 97%  BMI 34.8 kg/m2 Estimated body mass index is 34.8 kg/(m^2) as calculated from the following:    Height as of this encounter: 5' 5\" (1.651 m).    Weight as of this encounter: 209 lb 1.6 oz (94.8 kg).  Medication Reconciliation: complete      Health Maintenance that is potentially due pending provider review:  PHQ9 and GAD7    Completing forms today.    CHRISTIANE Noonan  "

## 2018-10-31 ASSESSMENT — ANXIETY QUESTIONNAIRES: GAD7 TOTAL SCORE: 3

## 2018-11-01 NOTE — PROGRESS NOTES
Results are consistent with subclinical hypothyroidism. Similar results in 2008.  Attempted to call the patient but she does not have a valid phone number in her chart.      I would recommend repeating TSH/T4 in 6 months to 1 yr.     Letter sent to patient.     Keisha Onofre MD

## 2018-11-07 ENCOUNTER — TELEPHONE (OUTPATIENT)
Dept: FAMILY MEDICINE | Facility: CLINIC | Age: 38
End: 2018-11-07

## 2018-11-07 NOTE — TELEPHONE ENCOUNTER
Forms received from  [facility]  for review and signature  Placed forms: in your box  Forms need to be faxe to [phone]  Patient call? no  Copy sent to scanning? Send to [facility] when finished     All.CHRISTIANE Garcia

## 2019-01-08 ENCOUNTER — OFFICE VISIT (OUTPATIENT)
Dept: FAMILY MEDICINE | Facility: CLINIC | Age: 39
End: 2019-01-08
Payer: MEDICARE

## 2019-01-08 ENCOUNTER — THERAPY VISIT (OUTPATIENT)
Dept: PHYSICAL THERAPY | Facility: CLINIC | Age: 39
End: 2019-01-08
Payer: MEDICARE

## 2019-01-08 VITALS
HEIGHT: 65 IN | DIASTOLIC BLOOD PRESSURE: 82 MMHG | SYSTOLIC BLOOD PRESSURE: 112 MMHG | OXYGEN SATURATION: 97 % | HEART RATE: 80 BPM | BODY MASS INDEX: 36.22 KG/M2 | TEMPERATURE: 98.2 F | WEIGHT: 217.4 LBS

## 2019-01-08 DIAGNOSIS — E03.9 HYPOTHYROIDISM, UNSPECIFIED TYPE: Primary | ICD-10-CM

## 2019-01-08 DIAGNOSIS — M25.572 CHRONIC PAIN OF LEFT ANKLE: ICD-10-CM

## 2019-01-08 DIAGNOSIS — G89.29 CHRONIC PAIN OF LEFT ANKLE: ICD-10-CM

## 2019-01-08 PROCEDURE — 97110 THERAPEUTIC EXERCISES: CPT | Mod: GP | Performed by: PHYSICAL THERAPIST

## 2019-01-08 PROCEDURE — 99213 OFFICE O/P EST LOW 20 MIN: CPT | Performed by: PHYSICIAN ASSISTANT

## 2019-01-08 PROCEDURE — 97140 MANUAL THERAPY 1/> REGIONS: CPT | Mod: GP | Performed by: PHYSICAL THERAPIST

## 2019-01-08 RX ORDER — DEXTROAMPHETAMINE SACCHARATE, AMPHETAMINE ASPARTATE MONOHYDRATE, DEXTROAMPHETAMINE SULFATE AND AMPHETAMINE SULFATE 7.5; 7.5; 7.5; 7.5 MG/1; MG/1; MG/1; MG/1
30 CAPSULE, EXTENDED RELEASE ORAL DAILY PRN
Refills: 0 | COMMUNITY
Start: 2019-01-03

## 2019-01-08 RX ORDER — LEVOTHYROXINE SODIUM 25 UG/1
25 TABLET ORAL DAILY
Qty: 30 TABLET | Refills: 1 | Status: SHIPPED | OUTPATIENT
Start: 2019-01-08 | End: 2021-03-29

## 2019-01-08 ASSESSMENT — MIFFLIN-ST. JEOR: SCORE: 1677

## 2019-01-08 NOTE — NURSING NOTE
"Chief Complaint   Patient presents with     Results     patient here to discuss thyroid lab results that were done 10/2018     /82   Pulse 80   Temp 98.2  F (36.8  C) (Oral)   Ht 1.651 m (5' 5\")   Wt 98.6 kg (217 lb 6.4 oz)   LMP 12/14/2018   SpO2 97%   BMI 36.18 kg/m   Estimated body mass index is 36.18 kg/m  as calculated from the following:    Height as of this encounter: 1.651 m (5' 5\").    Weight as of this encounter: 98.6 kg (217 lb 6.4 oz).  Medication Reconciliation: complete      Health Maintenance that is potentially due pending provider review:  NONE    n/a    CHRISTIANE Noonan  "

## 2019-01-08 NOTE — PROGRESS NOTES
"  SUBJECTIVE:   Melba Cornelius is a 38 year old female who presents to clinic today for the following health issues:      Chief Complaint   Patient presents with     Results     patient here to discuss thyroid lab results that were done 10/2018            Problem list and histories reviewed & adjusted, as indicated.  Additional history: 37 y/o new to me female here to go over some labs that were done last fall.  She did get a letter discussing her thyroid.  She is not quite sure what they mean.  Looking back in her chart, she did have elevated TSH in 2008 with low normal T4.  TSH was normal in 2009.    She has long history of mental health issues that may stem from a TBI in 2016.  Admits to some lethargy, fatigue, weight gain, and depression.  Is currently on abilify, celexa, adderall xr.  Does follow with psych.    BP Readings from Last 3 Encounters:   01/08/19 112/82   10/30/18 120/86   07/20/17 118/72    Wt Readings from Last 3 Encounters:   01/08/19 98.6 kg (217 lb 6.4 oz)   10/30/18 94.8 kg (209 lb 1.6 oz)   07/20/17 90.7 kg (200 lb)                    Reviewed and updated as needed this visit by clinical staff       Reviewed and updated as needed this visit by Provider         ROS:  Constitutional, HEENT, cardiovascular, pulmonary, gi and gu systems are negative, except as otherwise noted.    OBJECTIVE:     /82   Pulse 80   Temp 98.2  F (36.8  C) (Oral)   Ht 1.651 m (5' 5\")   Wt 98.6 kg (217 lb 6.4 oz)   LMP 12/14/2018   SpO2 97%   BMI 36.18 kg/m    Body mass index is 36.18 kg/m .  GENERAL: alert and no distress  EYES: Eyes grossly normal to inspection  PSYCH: mentation appears normal, affect normal/bright    Diagnostic Test Results:  none     ASSESSMENT/PLAN:             1. Hypothyroidism, unspecified type  Went over her elevated TSH, and due to her beng symptomatic, will trial low dose synthroid.  Will have her follow up in 6 weeks to recheck.  - levothyroxine (SYNTHROID/LEVOTHROID) 25 MCG " tablet; Take 1 tablet (25 mcg) by mouth daily  Dispense: 30 tablet; Refill: 1  - TSH with free T4 reflex; Future        Audi Corrales PA-C  [facility]

## 2019-01-22 ENCOUNTER — THERAPY VISIT (OUTPATIENT)
Dept: PHYSICAL THERAPY | Facility: CLINIC | Age: 39
End: 2019-01-22
Payer: MEDICARE

## 2019-01-22 DIAGNOSIS — G89.29 CHRONIC PAIN OF LEFT ANKLE: ICD-10-CM

## 2019-01-22 DIAGNOSIS — M25.572 CHRONIC PAIN OF LEFT ANKLE: ICD-10-CM

## 2019-01-22 PROCEDURE — 97140 MANUAL THERAPY 1/> REGIONS: CPT | Mod: GP | Performed by: PHYSICAL THERAPIST

## 2019-01-22 PROCEDURE — 97110 THERAPEUTIC EXERCISES: CPT | Mod: GP | Performed by: PHYSICAL THERAPIST

## 2019-03-22 PROBLEM — M25.572 CHRONIC PAIN OF LEFT ANKLE: Status: RESOLVED | Noted: 2018-10-29 | Resolved: 2019-03-22

## 2019-03-22 PROBLEM — G89.29 CHRONIC PAIN OF LEFT ANKLE: Status: RESOLVED | Noted: 2018-10-29 | Resolved: 2019-03-22

## 2019-03-22 NOTE — PROGRESS NOTES
Subjective:  HPI                    Objective:  System    Physical Exam    General     ROS    Assessment/Plan:    DISCHARGE REPORT    Progress reporting period is from 10/29/18 to 1/22/19.     SUBJECTIVE  Subjective: Feels looser and stronger after last visit           Changes in function: Yes, see goal flow sheet for change in function   Adverse reactions: None;   ,     Patient has failed to return to therapy so current objective findings are unknown.  The subjective and objective information are from the last SOAP note on this patient.    OBJECTIVE  Objective: DF 5 degrees and PF 30 degrees, Graet toe minimal extension      ASSESSMENT/PLAN  Updated problem list and treatment plan: Diagnosis 1:  Ankle foot  Pain -  hot/cold therapy, manual therapy, self management, education and home program  Decreased ROM/flexibility - manual therapy and therapeutic exercise  Decreased joint mobility - manual therapy and therapeutic exercise  Decreased strength - therapeutic exercise and therapeutic activities  Impaired balance - neuro re-education and therapeutic activities  Decreased proprioception - neuro re-education and therapeutic activities  Impaired gait - gait training  Impaired muscle performance - neuro re-education  Decreased function - therapeutic activities  STG/LTGs have been met or progress has been made towards goals:  None  Assessment of Progress: Patient has not returned to therapy.  Current status is unknown and discharge G code cannot be reported.  Self Management Plans:  Patient has been instructed in a home treatment program.    Melba continues to require the following intervention to meet STG and LTG's: PT intervention is no longer required to meet STG/LTG.  The patient failed to complete scheduled/ordered appointments so current information is unknown.    Recommendations:  This patient is ready to be discharged from therapy and continue their home treatment program.    Please refer to the daily flowsheet  for treatment today, total treatment time and time spent performing 1:1 timed codes.

## 2021-02-23 ENCOUNTER — HOSPITAL ENCOUNTER (EMERGENCY)
Facility: HOSPITAL | Age: 41
Discharge: HOME | End: 2021-02-23
Attending: NURSE PRACTITIONER | Admitting: NURSE PRACTITIONER
Payer: MEDICARE

## 2021-02-23 ENCOUNTER — HOSPITAL ENCOUNTER (EMERGENCY)
Facility: HOSPITAL | Age: 41
Discharge: HOME | End: 2021-02-23
Attending: FAMILY MEDICINE | Admitting: FAMILY MEDICINE
Payer: MEDICARE

## 2021-02-23 VITALS
SYSTOLIC BLOOD PRESSURE: 114 MMHG | TEMPERATURE: 97.3 F | DIASTOLIC BLOOD PRESSURE: 83 MMHG | OXYGEN SATURATION: 100 % | RESPIRATION RATE: 16 BRPM | HEART RATE: 100 BPM

## 2021-02-23 VITALS
HEIGHT: 65 IN | BODY MASS INDEX: 31.49 KG/M2 | DIASTOLIC BLOOD PRESSURE: 97 MMHG | HEART RATE: 99 BPM | OXYGEN SATURATION: 96 % | TEMPERATURE: 96 F | SYSTOLIC BLOOD PRESSURE: 123 MMHG | WEIGHT: 189 LBS | RESPIRATION RATE: 18 BRPM

## 2021-02-23 DIAGNOSIS — G89.29 ACUTE EXACERBATION OF CHRONIC LOW BACK PAIN: ICD-10-CM

## 2021-02-23 DIAGNOSIS — A60.00 GENITAL HERPES SIMPLEX, UNSPECIFIED SITE: ICD-10-CM

## 2021-02-23 DIAGNOSIS — M54.50 ACUTE EXACERBATION OF CHRONIC LOW BACK PAIN: ICD-10-CM

## 2021-02-23 PROCEDURE — 99213 OFFICE O/P EST LOW 20 MIN: CPT | Performed by: NURSE PRACTITIONER

## 2021-02-23 PROCEDURE — 99283 EMERGENCY DEPT VISIT LOW MDM: CPT | Performed by: FAMILY MEDICINE

## 2021-02-23 PROCEDURE — G0463 HOSPITAL OUTPT CLINIC VISIT: HCPCS

## 2021-02-23 PROCEDURE — 250N000013 HC RX MED GY IP 250 OP 250 PS 637: Mod: GY | Performed by: FAMILY MEDICINE

## 2021-02-23 PROCEDURE — 99283 EMERGENCY DEPT VISIT LOW MDM: CPT

## 2021-02-23 RX ORDER — BUPRENORPHINE AND NALOXONE 2; .5 MG/1; MG/1
1 FILM, SOLUBLE BUCCAL; SUBLINGUAL DAILY
Status: ON HOLD | COMMUNITY
End: 2021-05-25

## 2021-02-23 RX ORDER — IBUPROFEN 800 MG/1
800 TABLET, FILM COATED ORAL EVERY 8 HOURS PRN
Qty: 24 TABLET | Refills: 0 | Status: SHIPPED | OUTPATIENT
Start: 2021-02-23 | End: 2023-06-02

## 2021-02-23 RX ORDER — VALACYCLOVIR HYDROCHLORIDE 1 G/1
1000 TABLET, FILM COATED ORAL EVERY 12 HOURS SCHEDULED
Status: DISCONTINUED | OUTPATIENT
Start: 2021-02-23 | End: 2021-02-23

## 2021-02-23 RX ORDER — CYCLOBENZAPRINE HCL 10 MG
10 TABLET ORAL 3 TIMES DAILY PRN
Qty: 6 TABLET | Refills: 0 | Status: SHIPPED | OUTPATIENT
Start: 2021-02-23 | End: 2021-03-29

## 2021-02-23 RX ORDER — VALACYCLOVIR HYDROCHLORIDE 1 G/1
1000 TABLET, FILM COATED ORAL 2 TIMES DAILY
Qty: 20 TABLET | Refills: 0 | Status: SHIPPED | OUTPATIENT
Start: 2021-02-23 | End: 2021-03-29

## 2021-02-23 RX ORDER — VALACYCLOVIR HYDROCHLORIDE 1 G/1
1000 TABLET, FILM COATED ORAL EVERY 12 HOURS SCHEDULED
Status: COMPLETED | OUTPATIENT
Start: 2021-02-23 | End: 2021-02-23

## 2021-02-23 RX ADMIN — VALACYCLOVIR HYDROCHLORIDE 1000 MG: 1 TABLET, FILM COATED ORAL at 02:02

## 2021-02-23 ASSESSMENT — ENCOUNTER SYMPTOMS
FEVER: 0
CHILLS: 0
VOMITING: 0
COUGH: 0
DIFFICULTY URINATING: 0
ABDOMINAL PAIN: 0
NAUSEA: 0
DYSURIA: 0
FLANK PAIN: 0
SHORTNESS OF BREATH: 0

## 2021-02-23 ASSESSMENT — MIFFLIN-ST. JEOR: SCORE: 1538.18

## 2021-02-23 NOTE — DISCHARGE INSTRUCTIONS
Valtrex 1gm 2x a day for 10 days   Avoid shaving in that area  Tylenol and ibuprofen for discomfort  Follow-up with your primary clinic if no improvement or any concerns  Return to the emergency room if worsening

## 2021-02-23 NOTE — ED NOTES
Patient called and notes that her RX was not at the pharmacy.  Valtrex RX was reprinted and faxed to pharmacy of her choice.

## 2021-02-23 NOTE — ED PROVIDER NOTES
History     Chief Complaint   Patient presents with     STD     flair up started 2 days ago     HPI  Melba Cornelius is a 40 year old female who presents with a genital herpes flare.    She states that it has been quite a while since she had a flare.  She was treated previously with Valtrex.  She states that there is mainly one painful lesion.  She did shave in that area but does not think it is from shaving.  No history of sebaceous cysts.  Normal vaginal discharge.  No dysuria.    Allergies:  Allergies   Allergen Reactions     Droperidol Shortness Of Breath     Haldol Anxiety, Difficulty breathing, Muscle Pain (Myalgia), Palpitations, Photosensitivity, Shortness Of Breath and Visual Disturbance     Haloperidol Shortness Of Breath     Other reaction(s): Tetany     Ketorolac Shortness Of Breath     Promethazine Other (See Comments) and Shortness Of Breath     anxiety     Bupropion Other (See Comments) and Unknown     Side effect  Side effect.  Became very suicidal and physically ill.  Suicidal thoughts     Inapsine [Butyrophenones]      No Clinical Screening - See Comments      See Scan 11/00 For Multiple Intolerances     Phenothiazine      Wellbutrin [Bupropion Hcl]      Metoclopramide Anxiety       Problem List:    Patient Active Problem List    Diagnosis Date Noted     Abdominal wall hernia 03/28/2017     Priority: Medium     Closed head injury 12/31/2016     Priority: Medium     History of manic depressive disorder 12/31/2016     Priority: Medium     Personality disorder (H) 12/31/2016     Priority: Medium     Self-destructive behavior 12/31/2016     Priority: Medium     Foreign body in stomach 12/29/2016     Priority: Medium     Drug-seeking behavior 09/14/2016     Priority: Medium     Overview:    shows pt picked up 20 tablets of Percocet on 7/5/16 but pt states she hasn't had any narc pain medications since her fall in [place] in early June. States she has been to some ERs but they will only give  her ibuprofen or tylenol.       History of suicide attempt 09/14/2016     Priority: Medium     History of migraine headaches 03/15/2016     Priority: Medium     Polysubstance abuse (H) 01/18/2016     Priority: Medium     Overview:   Overview:   THC, methamphetamine, methadone    Used methamphetamine in 2007 and committed for 1 year in [place]  THC 1x/month  Methadone from Dr. Gonzalez (50 mg 2x/day migraine HAs) in [facility] appt 2013  Overview:   THC, methamphetamine, methadone    Used methamphetamine in 2007 and committed for 1 year in [place]  THC 1x/month  Methadone from Dr. Gonzalez (50 mg 2x/day migraine HAs) in [facility] appt 2013       Closed pilon fracture of tibia 10/12/2015     Priority: Medium     Mixed, or nondependent drug abuse 06/18/2015     Priority: Medium     Overview:   Overview:   THC, methamphetamine, methadone    Used methamphetamine in 2007 and committed for 1 year in [place]  THC 1x/month  Methadone from Dr. Gonzalez (50 mg 2x/day migraine HAs) in 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     Priority: Medium     Opioid dependence (H) 05/04/2015     Priority: Medium     Alcohol abuse 04/09/2015     Priority: Medium     Amphetamine abuse (H) 04/09/2015     Priority: Medium     Nondependent cannabis abuse, episodic 04/09/2015     Priority: Medium     Generalized anxiety disorder 04/09/2015     Priority: Medium     Opioid abuse (H) 04/09/2015     Priority: Medium     Other stimulant dependence, uncomplicated (H) 04/09/2015     Priority: Medium     Tobacco dependence syndrome 04/09/2015     Priority: Medium     Overview:   Overview:   started age 20, 3 cigs/day       Attention deficit disorder 12/15/2014     Priority: Medium     Overview:   adderall STOPPED by [facility] psychiatrist in July 2014 due to overuse and (+) urine drug screen  Please see UTOX 2/4/2014 - (+) methamphetamine, THC, methadone       Bipolar affective disorder  (H) 12/15/2014     Priority: Medium     Concussion injury of body structure 12/15/2014     Priority: Medium     Seizure (H) 12/15/2014     Priority: Medium     Delusions (H) 12/15/2014     Priority: Medium     Delusional disorder (H) 12/15/2014     Priority: Medium     Posttraumatic stress disorder 12/15/2014     Priority: Medium     Spells of decreased attentiveness 12/15/2014     Priority: Medium     Homeless 06/03/2014     Priority: Medium     Panic disorder with agoraphobia 10/31/2013     Priority: Medium     Cannabis abuse 09/02/2013     Priority: Medium     Methadone misuse 09/02/2013     Priority: Medium     Other long term (current) drug therapy 11/19/2012     Priority: Medium     CARDIOVASCULAR SCREENING; LDL GOAL LESS THAN 160 10/31/2010     Priority: Medium     Noncompliance with treatment 01/14/2010     Priority: Medium     Bipolar I disorder (H) 11/01/2009     Priority: Medium     Borderline personality disorder (H) 02/05/2009     Priority: Medium     Cannabis dependence (H) 02/05/2009     Priority: Medium     Psychostimulant dependence in remission (H) 02/05/2009     Priority: Medium     Bipolar I disorder, most recent episode (or current) depressed, severe, specified as with psychotic behavior (H) 02/05/2009     Priority: Medium     Tobacco use 12/31/2008     Priority: Medium     Overview:   started age 20, 3 cigs/day       Auditory hallucinations 12/31/2008     Priority: Medium     (Problem list name updated by automated process. Provider to review and confirm.)       Acne 12/31/2008     Priority: Medium     Depression 05/15/2006     Priority: Medium     Headache 05/15/2006     Priority: Medium     Intractable migraine 05/15/2006     Priority: Medium     Cervical syndrome 05/15/2006     Priority: Medium     Intractable migraine      Priority: Medium     Problem list name updated by automated process. Provider to review       Depressive disorder, not elsewhere classified      Priority: Medium     "    Past Medical History:    Past Medical History:   Diagnosis Date     Acne      Major depressive disorder      Migraine headache      Polysubstance abuse (H)      Smoker      Suicide attempt (H)        Past Surgical History:    Past Surgical History:   Procedure Laterality Date     BACK SURGERY       C LT X-RAY HEEL      surgery     HC REMOVE TONSILS/ADENOIDS,14+ Y/O      T & A 14+y.o.     HC TOOTH EXTRACTION W/FORCEP         Family History:    Family History   Problem Relation Age of Onset     Diabetes Maternal Aunt      Heart Disease Maternal Grandmother          at 55     Cancer No family hx of      Neurologic Disorder No family hx of        Social History:  Marital Status:   [2]  Social History     Tobacco Use     Smoking status: Current Every Day Smoker     Packs/day: 0.25     Types: Cigarettes     Smokeless tobacco: Never Used     Tobacco comment: TRYING TO QUIT, pt declined   Substance Use Topics     Alcohol use: No     Alcohol/week: 0.0 standard drinks     Drug use: No        Medications:    amphetamine-dextroamphetamine (ADDERALL XR) 20 MG 24 hr capsule  ARIPiprazole (ABILIFY) 5 MG tablet  buprenorphine HCl-naloxone HCl (SUBOXONE) 2-0.5 MG per film  CITALOPRAM HYDROBROMIDE PO  medical cannabis (Patient's own supply)  levothyroxine (SYNTHROID/LEVOTHROID) 25 MCG tablet          Review of Systems   Constitutional: Negative for chills and fever.   Respiratory: Negative for cough and shortness of breath.    Gastrointestinal: Negative for abdominal pain, nausea and vomiting.   Genitourinary: Positive for genital sores. Negative for difficulty urinating, dysuria, flank pain, vaginal bleeding and vaginal discharge.   All other systems reviewed and are negative.      Physical Exam   BP: 123/97  Pulse: 99  Temp: 96  F (35.6  C)  Resp: 18  Height: 165.1 cm (5' 5\")  Weight: 85.7 kg (189 lb)  SpO2: 96 %      Physical Exam  Vitals signs and nursing note reviewed. Exam conducted with a chaperone " present.   Constitutional:       Appearance: Normal appearance.   HENT:      Head: Atraumatic.   Neck:      Musculoskeletal: Normal range of motion.   Cardiovascular:      Rate and Rhythm: Normal rate.   Pulmonary:      Effort: Pulmonary effort is normal.   Abdominal:      Palpations: Abdomen is soft.      Tenderness: There is no abdominal tenderness.   Genitourinary:     Comments: Small genital ulcer lesion at 7:00 consistent with genital herpes. Recent shaving in genital area with no signs of cellulitis or folliculitis.   Skin:     Comments: See above     Neurological:      Mental Status: She is alert and oriented to person, place, and time.   Psychiatric:         Mood and Affect: Mood normal.         ED Course                 No results found for this or any previous visit (from the past 24 hour(s)).    Medications   valACYclovir (VALTREX) tablet 1,000 mg (has no administration in time range)       Assessments & Plan (with Medical Decision Making): 40-year-old female who presents concerned about a genital herpes outbreak.  She states she has 1 painful lesion.  She has not had an outbreak in a long time.  She used to get Valtrex.  On exam with RN present, she does have a ulcer lesion that is consistent with genital herpes.  Since she has had this before, no culture was sent.  She was given Valtrex now and a prescription for 1 g twice daily for 10 days.  Instructed to not shave in that area for the next 10 days or until rash is improved.  Tylenol or ibuprofen if needed.  Follow-up with primary physician if any concerns and return to the emergency room if any worsening symptoms.     I have reviewed the nursing notes.    I have reviewed the findings, diagnosis, plan and need for follow up with the patient.      Discharge Medication List as of 2/23/2021  1:59 AM      START taking these medications    Details   valACYclovir (VALTREX) 1000 mg tablet Take 1 tablet (1,000 mg) by mouth 2 times daily for 10 days, Disp-20  tablet, R-0, E-Prescribe             Final diagnoses:   Genital herpes simplex, unspecified site       2/23/2021   [facility]     Kavita Bernal MD  02/23/21 [id number]

## 2021-02-24 ASSESSMENT — ENCOUNTER SYMPTOMS
ACTIVITY CHANGE: 1
BACK PAIN: 1
NAUSEA: 0
FATIGUE: 1
DIZZINESS: 0
HEADACHES: 0
FEVER: 0
LIGHT-HEADEDNESS: 0
VOMITING: 0
SHORTNESS OF BREATH: 0
CHILLS: 0

## 2021-02-24 NOTE — ED TRIAGE NOTES
Pt is here with c/o right foot and low back pain that is chronic   Pt reports she just wants a crutch or a cane because she has no money to bye one  Pt denies any new injury or trauma

## 2021-02-24 NOTE — DISCHARGE INSTRUCTIONS
Keep affected extremity elevated as much as possible for next 24 - 48 hours. Ice to affected area 20 minutes every hour as needed for comfort. After 48 hours you can apply heat. Ibuprofen 600 to 800 mg (3 - 4 tabs of over the counter med) every six to eight hours as needed;not to exceed maximum amount of 3200 mg in 24 hours. Take with food. Tylenol 650 to 1000 mg every four to six hours as needed (not to exceed more than 4000 mg in a 24 hour period). May use interchangeably. Suggest medicating around the clock for the next 24-48 hours.  Follow up with primary provider as needed  Crutches as needed

## 2021-02-24 NOTE — ED PROVIDER NOTES
"  History     Chief Complaint   Patient presents with     Back Pain     c/o lower back pain since 2016, pt new to the area and has live here for 3 weeks. pt requesting a cane or a crutch \"if possible\". pt seen here at 0300 today for herpes. notes \"I mentioned the pain while I was here\".      Foot Pain     lt heel pain since 2016     HPI  Melba Cornelius is a 40 year old female who was brought in per ambulance. She  presents with exacerbation of bilateral low back pain and left heel pain for the past three days. Exacerbation brought about from increased activity and walking, because her car is not working. Symptoms accompanied per fatigue. History of lumbar spine fusion after intentionally jumping off a three story building in 2016 when she was in [place]. She broke her back and shattered her left heel. Incisional hernia repair in 2018. She wears a back brace. Warm bath and rest seem to decrease her discomfort for short periods of time.  She has appointment to see orthopedics for her heel this upcoming Thursday. Smoker. Denies fevers, chills, nausea, vomiting, diarrhea, and shortness of breath.    Back Pain       Duration: three days exacerbation of back pain.         Specific cause: car quit working and now has been doing all her errands on foot    Description:   Location of pain: low back bilateral  Character of pain: deep dull ache and constant  Pain radiation:none  New numbness or weakness in legs, not attributed to pain:  no (feels left heel weakness)    Intensity: Currently 9/10    History:   Pain interferes with job: No  History of back problems: recurrent self limited episodes of low back pain in the past and previous spinal surgery - 2016 in [place].   Any previous MRI or X-rays: Yes- at [facility].  Date 2016  Sees a specialist for back pain:  orthopedics for her heel on thursday  Therapies tried without relief: shower and rest    Alleviating factors:   Improved by: warm bath and rest helps  For " short time    Precipitating factors:  Worsened by: Walking    Accompanying Signs & Symptoms:  Risk of Fracture:  None  Risk of Cauda Equina: No unexplained bowel or bladder changes  Risk of Infection:  None  Risk of Cancer:  None  Risk of Ankylosing Spondylitis:  Onset at age <35, male, AND morning back stiffness. no     Allergies:  Allergies   Allergen Reactions     Droperidol Shortness Of Breath     Haldol Anxiety, Difficulty breathing, Muscle Pain (Myalgia), Palpitations, Photosensitivity, Shortness Of Breath and Visual Disturbance     Haloperidol Shortness Of Breath     Other reaction(s): Tetany     Ketorolac Shortness Of Breath     Promethazine Other (See Comments) and Shortness Of Breath     anxiety     Bupropion Other (See Comments) and Unknown     Side effect  Side effect.  Became very suicidal and physically ill.  Suicidal thoughts     Inapsine [Butyrophenones]      No Clinical Screening - See Comments      See Scan 11/00 For Multiple Intolerances     Phenothiazine      Wellbutrin [Bupropion Hcl]      Metoclopramide Anxiety       Problem List:    Patient Active Problem List    Diagnosis Date Noted     Abdominal wall hernia 03/28/2017     Priority: Medium     Closed head injury 12/31/2016     Priority: Medium     History of manic depressive disorder 12/31/2016     Priority: Medium     Personality disorder (H) 12/31/2016     Priority: Medium     Self-destructive behavior 12/31/2016     Priority: Medium     Foreign body in stomach 12/29/2016     Priority: Medium     Drug-seeking behavior 09/14/2016     Priority: Medium     Overview:    shows pt picked up 20 tablets of Percocet on 7/5/16 but pt states she hasn't had any narc pain medications since her fall in [place] in early June. States she has been to some ERs but they will only give her ibuprofen or tylenol.       History of suicide attempt 09/14/2016     Priority: Medium     History of migraine headaches 03/15/2016     Priority: Medium      Polysubstance abuse (H) 01/18/2016     Priority: Medium     Overview:   Overview:   THC, methamphetamine, methadone    Used methamphetamine in 2007 and committed for 1 year in [place]  THC 1x/month  Methadone from Dr. Gonzalez (50 mg 2x/day migraine HAs) in [facility] appt 2013  Overview:   THC, methamphetamine, methadone    Used methamphetamine in 2007 and committed for 1 year in [place]  THC 1x/month  Methadone from Dr. Gonzalez (50 mg 2x/day migraine HAs) in [facility] appt 2013       Closed pilon fracture of tibia 10/12/2015     Priority: Medium     Mixed, or nondependent drug abuse 06/18/2015     Priority: Medium     Overview:   Overview:   THC, methamphetamine, methadone    Used methamphetamine in 2007 and committed for 1 year in [place]  THC 1x/month  Methadone from Dr. 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     Priority: Medium     Opioid dependence (H) 05/04/2015     Priority: Medium     Alcohol abuse 04/09/2015     Priority: Medium     Amphetamine abuse (H) 04/09/2015     Priority: Medium     Nondependent cannabis abuse, episodic 04/09/2015     Priority: Medium     Generalized anxiety disorder 04/09/2015     Priority: Medium     Opioid abuse (H) 04/09/2015     Priority: Medium     Other stimulant dependence, uncomplicated (H) 04/09/2015     Priority: Medium     Tobacco dependence syndrome 04/09/2015     Priority: Medium     Overview:   Overview:   started age 20, 3 cigs/day       Attention deficit disorder 12/15/2014     Priority: Medium     Overview:   adderall STOPPED by [facility] psychiatrist in July 2014 due to overuse and (+) urine drug screen  Please see UTOX 2/4/2014 - (+) methamphetamine, THC, methadone       Bipolar affective disorder (H) 12/15/2014     Priority: Medium     Concussion injury of body structure 12/15/2014     Priority: Medium     Seizure (H) 12/15/2014     Priority: Medium      Delusions (H) 12/15/2014     Priority: Medium     Delusional disorder (H) 12/15/2014     Priority: Medium     Posttraumatic stress disorder 12/15/2014     Priority: Medium     Spells of decreased attentiveness 12/15/2014     Priority: Medium     Homeless 06/03/2014     Priority: Medium     Panic disorder with agoraphobia 10/31/2013     Priority: Medium     Cannabis abuse 09/02/2013     Priority: Medium     Methadone misuse 09/02/2013     Priority: Medium     Other long term (current) drug therapy 11/19/2012     Priority: Medium     CARDIOVASCULAR SCREENING; LDL GOAL LESS THAN 160 10/31/2010     Priority: Medium     Noncompliance with treatment 01/14/2010     Priority: Medium     Bipolar I disorder (H) 11/01/2009     Priority: Medium     Borderline personality disorder (H) 02/05/2009     Priority: Medium     Cannabis dependence (H) 02/05/2009     Priority: Medium     Psychostimulant dependence in remission (H) 02/05/2009     Priority: Medium     Bipolar I disorder, most recent episode (or current) depressed, severe, specified as with psychotic behavior (H) 02/05/2009     Priority: Medium     Tobacco use 12/31/2008     Priority: Medium     Overview:   started age 20, 3 cigs/day       Auditory hallucinations 12/31/2008     Priority: Medium     (Problem list name updated by automated process. Provider to review and confirm.)       Acne 12/31/2008     Priority: Medium     Depression 05/15/2006     Priority: Medium     Headache 05/15/2006     Priority: Medium     Intractable migraine 05/15/2006     Priority: Medium     Cervical syndrome 05/15/2006     Priority: Medium     Intractable migraine      Priority: Medium     Problem list name updated by automated process. Provider to review       Depressive disorder, not elsewhere classified      Priority: Medium        Past Medical History:    Past Medical History:   Diagnosis Date     Acne      Major depressive disorder      Migraine headache      Polysubstance abuse (H) 2011      Smoker      Suicide attempt (H)        Past Surgical History:    Past Surgical History:   Procedure Laterality Date     BACK SURGERY       C LT X-RAY HEEL      surgery     HC REMOVE TONSILS/ADENOIDS,14+ Y/O      T & A 14+y.o.     HC TOOTH EXTRACTION W/FORCEP         Family History:    Family History   Problem Relation Age of Onset     Diabetes Maternal Aunt      Heart Disease Maternal Grandmother          at 55     Cancer No family hx of      Neurologic Disorder No family hx of        Social History:  Marital Status:   [2]  Social History     Tobacco Use     Smoking status: Current Every Day Smoker     Packs/day: 0.25     Types: Cigarettes     Smokeless tobacco: Never Used     Tobacco comment: TRYING TO QUIT, pt declined   Substance Use Topics     Alcohol use: No     Alcohol/week: 0.0 standard drinks     Drug use: No        Medications:    cyclobenzaprine (FLEXERIL) 10 MG tablet  ibuprofen (ADVIL/MOTRIN) 800 MG tablet  amphetamine-dextroamphetamine (ADDERALL XR) 20 MG 24 hr capsule  ARIPiprazole (ABILIFY) 5 MG tablet  buprenorphine HCl-naloxone HCl (SUBOXONE) 2-0.5 MG per film  CITALOPRAM HYDROBROMIDE PO  levothyroxine (SYNTHROID/LEVOTHROID) 25 MCG tablet  medical cannabis (Patient's own supply)  valACYclovir (VALTREX) 1000 mg tablet          Review of Systems   Constitutional: Positive for activity change and fatigue. Negative for chills and fever.   Respiratory: Negative for shortness of breath.    Gastrointestinal: Negative for nausea and vomiting.   Musculoskeletal: Positive for back pain.        Left heel pain   Skin: Negative.    Neurological: Negative for dizziness, light-headedness and headaches.       Physical Exam   BP: 114/83  Pulse: 100  Temp: 97.3  F (36.3  C)  Resp: 16  SpO2: 100 %      Physical Exam  Vitals signs and nursing note reviewed.   Constitutional:       General: She is in acute distress (mild to moderate).   HENT:      Head: Normocephalic.   Cardiovascular:      Rate and  Rhythm: Normal rate and regular rhythm.      Heart sounds: Normal heart sounds. No murmur.   Pulmonary:      Effort: Pulmonary effort is normal. No respiratory distress.      Breath sounds: Normal breath sounds. No wheezing.   Musculoskeletal:         General: Tenderness present.      Comments: Nature of onset increased activity and ambulation due to motor vehicle not working  Location bilateral lumbar back  Character dull aching  Radiation none  Pain at night at times    Musculoskeletal: Lumbar and thoracic spine without gross deformity, rash, erythema, or ecchymosis. No trauma noted.        No tenderness, spasms, pain, or step-offs noted with spinal palpation.  Paraspinous muscle tenderness over L4 and L5 surgical incision in this region. No numbness or tingling in pelvic or gluteal muscles (spinal anesthesia). Pain does not radiates into her legs.  Pain is same as chronic pain. Denies incontinency of bowel or bladder.     Inspection:  Is able to get up from chair with little difficulty  Ambulation favors left foot, limps  Posture good  Shoulders even bilaterally    iliac crest even bilaterally  Flexion at waist: 90  degrees. extension 15 degrees  Lateral flexion:   R) 20 degrees.  L) 10 degrees  Stand on tip toes (S1). Yes R>L  Rock on heels (L4).  Right ok. Difficulty with left  Flex toes up (L5). yes    L1, 2, 3:  Bend right knee from standing position to 90 degrees before having pain.  Bend left  knee from standing position to 70 degrees before having pain.    L2, 3: quad muscles extend leg and hold against resistance. R) strong L) strong    L4: (anterior tibialis - dorsi flex foot R) yes  L)yes    L5: press great toe up  R) yes L) yes    S1: gastrocnemius flex toe down R) yes L) yes    Sensation bilaterally in feet: L4 - medial malleolus yes         L 5 - dorsal web space 1st and 2nd toe yes         S1 - lateral malleolus yes  Strength equal and strong bilaterally   No neuro deficits, no bowel/bladder  retention or incontinence. No spinal anesthesia.      Skin:     General: Skin is warm and dry.   Neurological:      Mental Status: She is alert and oriented to person, place, and time.      Sensory: Sensation is intact.      Motor: Motor function is intact. No weakness.      Coordination: Coordination is intact.      Deep Tendon Reflexes:      Reflex Scores:       Patellar reflexes are 1+ on the right side and 1+ on the left side.       Achilles reflexes are 1+ on the right side and 1+ on the left side.  Psychiatric:         Attention and Perception: Attention and perception normal.         Mood and Affect: Mood and affect normal.         Speech: Speech normal.         Behavior: Behavior normal. Behavior is cooperative.         Thought Content: Thought content normal. Thought content does not include suicidal ideation.         ED Course        Procedures             No results found for this or any previous visit (from the past 24 hour(s)).    Medications - No data to display    Assessments & Plan (with Medical Decision Making)     I have reviewed the nursing notes.    I have reviewed the findings, diagnosis, plan and need for follow up with the patient.  (M54.5,  G89.29) Acute exacerbation of chronic low back pain  Comment: 40 year old female who was brought in per ambulance. She  presents with exacerbation of bilateral low back pain and left heel pain for the past three days. Exacerbation brought about from increased activity and walking, because her car is not working. Symptoms accompanied per fatigue. History of lumbar spine fusion after intentionally jumping off a three story building in 2016 when she was in [place]. She broke her back and shattered her left heel. Incisional hernia repair in 2018. She wears a back brace. Warm bath and rest seem to decrease her discomfort for short periods of time.  She has appointment to see orthopedics for her heel this upcoming Thursday. Smoker. Denies fevers, chills,  nausea, vomiting, diarrhea, and shortness of breath.    MDM: NHT. Lungs CTA  See back assessment    Requested cane or crutches to walk home. Hers was left in her place of occupation and she needs assistive support for her left heel. Crutches provided.     Plan: Flexeril tid prn and ibuprofen. Education provided and/or discussed for this/these medications and for back care tips. She adamantly denies being suicidal.  Keep affected extremity elevated as much as possible for next 24 - 48 hours. Ice to affected area 20 minutes every hour as needed for comfort. After 48 hours you can apply heat. Ibuprofen 600 to 800 mg (3 - 4 tabs of over the counter med) every six to eight hours as needed;not to exceed maximum amount of 3200 mg in 24 hours. Take with food. Tylenol 650 to 1000 mg every four to six hours as needed (not to exceed more than 4000 mg in a 24 hour period). May use interchangeably. Suggest medicating around the clock for the next 24-48 hours.  Follow up with primary provider as needed  Crutches as needed    These discharge instructions and medications were reviewed with her and understanding verbalized.    New Prescriptions    CYCLOBENZAPRINE (FLEXERIL) 10 MG TABLET    Take 1 tablet (10 mg) by mouth 3 times daily as needed for muscle spasms    IBUPROFEN (ADVIL/MOTRIN) 800 MG TABLET    Take 1 tablet (800 mg) by mouth every 8 hours as needed for moderate pain       Final diagnoses:   Acute exacerbation of chronic low back pain       2/23/2021   [facility]       Lynn Hernandes, CNP  02/24/21 1706

## 2021-03-26 ENCOUNTER — HOSPITAL ENCOUNTER (EMERGENCY)
Facility: HOSPITAL | Age: 41
Discharge: HOME | End: 2021-03-26
Attending: NURSE PRACTITIONER | Admitting: NURSE PRACTITIONER
Payer: COMMERCIAL

## 2021-03-26 VITALS
HEART RATE: 73 BPM | RESPIRATION RATE: 16 BRPM | TEMPERATURE: 97 F | SYSTOLIC BLOOD PRESSURE: 117 MMHG | OXYGEN SATURATION: 98 % | DIASTOLIC BLOOD PRESSURE: 88 MMHG

## 2021-03-26 DIAGNOSIS — Z72.89 SELF-DESTRUCTIVE BEHAVIOR: ICD-10-CM

## 2021-03-26 DIAGNOSIS — R45.89 THOUGHTS OF SELF HARM: ICD-10-CM

## 2021-03-26 DIAGNOSIS — T74.21XA SEXUAL ASSAULT OF ADULT, INITIAL ENCOUNTER: ICD-10-CM

## 2021-03-26 LAB
ALBUMIN UR-MCNC: 10 MG/DL
AMPHETAMINES UR QL: ABNORMAL NG/ML
ANION GAP SERPL CALCULATED.3IONS-SCNC: 5 MMOL/L (ref 3–14)
APAP SERPL-MCNC: <2 MG/L (ref 10–30)
APPEARANCE UR: ABNORMAL
BACTERIA #/AREA URNS HPF: ABNORMAL /HPF
BARBITURATES UR QL SCN: NOT DETECTED NG/ML
BASOPHILS # BLD AUTO: 0 10E9/L (ref 0–0.2)
BASOPHILS NFR BLD AUTO: 0.3 %
BENZODIAZ UR QL SCN: NOT DETECTED NG/ML
BILIRUB UR QL STRIP: NEGATIVE
BUN SERPL-MCNC: 10 MG/DL (ref 7–30)
BUPRENORPHINE UR QL: ABNORMAL NG/ML
CALCIUM SERPL-MCNC: 8.6 MG/DL (ref 8.5–10.1)
CANNABINOIDS UR QL: ABNORMAL NG/ML
CHLORIDE SERPL-SCNC: 109 MMOL/L (ref 94–109)
CO2 SERPL-SCNC: 26 MMOL/L (ref 20–32)
COCAINE UR QL SCN: NOT DETECTED NG/ML
COLOR UR AUTO: YELLOW
CREAT SERPL-MCNC: 0.66 MG/DL (ref 0.52–1.04)
D-METHAMPHET UR QL: ABNORMAL NG/ML
DIFFERENTIAL METHOD BLD: NORMAL
EOSINOPHIL # BLD AUTO: 0 10E9/L (ref 0–0.7)
EOSINOPHIL NFR BLD AUTO: 0.3 %
ERYTHROCYTE [DISTWIDTH] IN BLOOD BY AUTOMATED COUNT: 13 % (ref 10–15)
ETHANOL SERPL-MCNC: <0.01 G/DL
GFR SERPL CREATININE-BSD FRML MDRD: >90 ML/MIN/{1.73_M2}
GLUCOSE SERPL-MCNC: 100 MG/DL (ref 70–99)
GLUCOSE UR STRIP-MCNC: NEGATIVE MG/DL
HCG UR QL: NEGATIVE
HCT VFR BLD AUTO: 42.3 % (ref 35–47)
HGB BLD-MCNC: 13.5 G/DL (ref 11.7–15.7)
HGB UR QL STRIP: NEGATIVE
IMM GRANULOCYTES # BLD: 0 10E9/L (ref 0–0.4)
IMM GRANULOCYTES NFR BLD: 0.1 %
KETONES UR STRIP-MCNC: NEGATIVE MG/DL
LEUKOCYTE ESTERASE UR QL STRIP: NEGATIVE
LYMPHOCYTES # BLD AUTO: 1.2 10E9/L (ref 0.8–5.3)
LYMPHOCYTES NFR BLD AUTO: 15.7 %
MCH RBC QN AUTO: 30.8 PG (ref 26.5–33)
MCHC RBC AUTO-ENTMCNC: 31.9 G/DL (ref 31.5–36.5)
MCV RBC AUTO: 96 FL (ref 78–100)
METHADONE UR QL SCN: NOT DETECTED NG/ML
MONOCYTES # BLD AUTO: 0.4 10E9/L (ref 0–1.3)
MONOCYTES NFR BLD AUTO: 5.4 %
MUCOUS THREADS #/AREA URNS LPF: PRESENT /LPF
NEUTROPHILS # BLD AUTO: 6.1 10E9/L (ref 1.6–8.3)
NEUTROPHILS NFR BLD AUTO: 78.2 %
NITRATE UR QL: NEGATIVE
NRBC # BLD AUTO: 0 10*3/UL
NRBC BLD AUTO-RTO: 0 /100
OPIATES UR QL SCN: NOT DETECTED NG/ML
OXYCODONE UR QL SCN: NOT DETECTED NG/ML
PCP UR QL SCN: NOT DETECTED NG/ML
PH UR STRIP: 6.5 PH (ref 4.7–8)
PLATELET # BLD AUTO: 210 10E9/L (ref 150–450)
POTASSIUM SERPL-SCNC: 3.8 MMOL/L (ref 3.4–5.3)
PROPOXYPH UR QL: NOT DETECTED NG/ML
RBC # BLD AUTO: 4.39 10E12/L (ref 3.8–5.2)
RBC #/AREA URNS AUTO: 9 /HPF (ref 0–2)
SALICYLATES SERPL-MCNC: 2 MG/DL
SODIUM SERPL-SCNC: 140 MMOL/L (ref 133–144)
SOURCE: ABNORMAL
SP GR UR STRIP: 1.03 (ref 1–1.03)
SQUAMOUS #/AREA URNS AUTO: 6 /HPF (ref 0–1)
TRICYCLICS UR QL SCN: NOT DETECTED NG/ML
TSH SERPL DL<=0.005 MIU/L-ACNC: 1.57 MU/L (ref 0.4–4)
UROBILINOGEN UR STRIP-MCNC: 2 MG/DL (ref 0–2)
WBC # BLD AUTO: 7.8 10E9/L (ref 4–11)
WBC #/AREA URNS AUTO: 2 /HPF (ref 0–5)

## 2021-03-26 PROCEDURE — 80179 DRUG ASSAY SALICYLATE: CPT | Performed by: NURSE PRACTITIONER

## 2021-03-26 PROCEDURE — 250N000011 HC RX IP 250 OP 636: Performed by: NURSE PRACTITIONER

## 2021-03-26 PROCEDURE — 87591 N.GONORRHOEAE DNA AMP PROB: CPT | Performed by: NURSE PRACTITIONER

## 2021-03-26 PROCEDURE — 80048 BASIC METABOLIC PNL TOTAL CA: CPT | Performed by: NURSE PRACTITIONER

## 2021-03-26 PROCEDURE — 250N000011 HC RX IP 250 OP 636: Performed by: EMERGENCY MEDICINE

## 2021-03-26 PROCEDURE — 87389 HIV-1 AG W/HIV-1&-2 AB AG IA: CPT | Performed by: NURSE PRACTITIONER

## 2021-03-26 PROCEDURE — 99285 EMERGENCY DEPT VISIT HI MDM: CPT

## 2021-03-26 PROCEDURE — 96372 THER/PROPH/DIAG INJ SC/IM: CPT | Performed by: NURSE PRACTITIONER

## 2021-03-26 PROCEDURE — 99284 EMERGENCY DEPT VISIT MOD MDM: CPT | Performed by: NURSE PRACTITIONER

## 2021-03-26 PROCEDURE — 87491 CHLMYD TRACH DNA AMP PROBE: CPT | Performed by: NURSE PRACTITIONER

## 2021-03-26 PROCEDURE — 82077 ASSAY SPEC XCP UR&BREATH IA: CPT | Performed by: NURSE PRACTITIONER

## 2021-03-26 PROCEDURE — 86706 HEP B SURFACE ANTIBODY: CPT | Performed by: NURSE PRACTITIONER

## 2021-03-26 PROCEDURE — 86803 HEPATITIS C AB TEST: CPT | Performed by: NURSE PRACTITIONER

## 2021-03-26 PROCEDURE — 87340 HEPATITIS B SURFACE AG IA: CPT | Performed by: NURSE PRACTITIONER

## 2021-03-26 PROCEDURE — 250N000013 HC RX MED GY IP 250 OP 250 PS 637: Performed by: NURSE PRACTITIONER

## 2021-03-26 PROCEDURE — 250N000013 HC RX MED GY IP 250 OP 250 PS 637: Performed by: EMERGENCY MEDICINE

## 2021-03-26 PROCEDURE — 84443 ASSAY THYROID STIM HORMONE: CPT | Performed by: NURSE PRACTITIONER

## 2021-03-26 PROCEDURE — 36415 COLL VENOUS BLD VENIPUNCTURE: CPT | Performed by: NURSE PRACTITIONER

## 2021-03-26 PROCEDURE — 86704 HEP B CORE ANTIBODY TOTAL: CPT | Performed by: NURSE PRACTITIONER

## 2021-03-26 PROCEDURE — 80306 DRUG TEST PRSMV INSTRMNT: CPT | Performed by: NURSE PRACTITIONER

## 2021-03-26 PROCEDURE — 80143 DRUG ASSAY ACETAMINOPHEN: CPT | Performed by: NURSE PRACTITIONER

## 2021-03-26 PROCEDURE — 81025 URINE PREGNANCY TEST: CPT | Performed by: NURSE PRACTITIONER

## 2021-03-26 PROCEDURE — 250N000009 HC RX 250: Performed by: NURSE PRACTITIONER

## 2021-03-26 PROCEDURE — 85025 COMPLETE CBC W/AUTO DIFF WBC: CPT | Performed by: NURSE PRACTITIONER

## 2021-03-26 PROCEDURE — 87522 HEPATITIS C REVRS TRNSCRPJ: CPT | Performed by: NURSE PRACTITIONER

## 2021-03-26 PROCEDURE — 81001 URINALYSIS AUTO W/SCOPE: CPT | Performed by: NURSE PRACTITIONER

## 2021-03-26 RX ORDER — ACETAMINOPHEN 325 MG/1
975 TABLET ORAL ONCE
Status: COMPLETED | OUTPATIENT
Start: 2021-03-26 | End: 2021-03-26

## 2021-03-26 RX ORDER — AZITHROMYCIN 250 MG/1
1000 TABLET, FILM COATED ORAL ONCE
Status: COMPLETED | OUTPATIENT
Start: 2021-03-26 | End: 2021-03-26

## 2021-03-26 RX ORDER — DIAZEPAM 5 MG
10 TABLET ORAL ONCE
Status: COMPLETED | OUTPATIENT
Start: 2021-03-26 | End: 2021-03-26

## 2021-03-26 RX ORDER — LEVONORGESTREL 1.5 MG/1
1.5 TABLET ORAL ONCE
Status: COMPLETED | OUTPATIENT
Start: 2021-03-26 | End: 2021-03-26

## 2021-03-26 RX ORDER — ONDANSETRON 4 MG/1
4 TABLET, ORALLY DISINTEGRATING ORAL ONCE
Status: COMPLETED | OUTPATIENT
Start: 2021-03-26 | End: 2021-03-26

## 2021-03-26 RX ADMIN — LIDOCAINE HYDROCHLORIDE 250 MG: 10 INJECTION, SOLUTION EPIDURAL; INFILTRATION; INTRACAUDAL; PERINEURAL at 12:36

## 2021-03-26 RX ADMIN — AZITHROMYCIN 1000 MG: 250 TABLET, FILM COATED ORAL at 12:23

## 2021-03-26 RX ADMIN — ONDANSETRON 4 MG: 4 TABLET, ORALLY DISINTEGRATING ORAL at 10:16

## 2021-03-26 RX ADMIN — ACETAMINOPHEN 975 MG: 325 TABLET, FILM COATED ORAL at 12:34

## 2021-03-26 RX ADMIN — DIAZEPAM 10 MG: 5 TABLET ORAL at 10:16

## 2021-03-26 RX ADMIN — LEVONORGESTREL 1.5 MG: 1.5 TABLET ORAL at 12:34

## 2021-03-26 ASSESSMENT — ENCOUNTER SYMPTOMS
DIARRHEA: 0
VOMITING: 0
NAUSEA: 0
NUMBNESS: 0
CHILLS: 0
WEAKNESS: 0
SHORTNESS OF BREATH: 0
FEVER: 0
WOUND: 0
ABDOMINAL PAIN: 0
HEADACHES: 1
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
## 2021-03-26 NOTE — ED NOTES
AVS reviewed with pt. Educated to come back with worsening symptoms. Pt states no further questions at this time. Pt SW in room and gave her a food box. Also called pt a taxi. Pt stating she is safe to go home and is not suicidal at this time. States she just needs to sleep.
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
## 2021-03-26 NOTE — DISCHARGE INSTRUCTIONS
Drink plenty of fluids;   Return for concerns.  Follow up with PCP and per behavioral health recommendations.
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
## 2021-03-28 LAB
C TRACH DNA SPEC QL NAA+PROBE: NOT DETECTED
HBV CORE AB SERPL QL IA: NONREACTIVE
HBV SURFACE AB SERPL IA-ACNC: 123.74 M[IU]/ML
HBV SURFACE AG SERPL QL IA: NONREACTIVE
HCV AB SERPL QL IA: REACTIVE
HIV 1+2 AB+HIV1 P24 AG SERPL QL IA: NONREACTIVE
N GONORRHOEA DNA SPEC QL NAA+PROBE: NOT DETECTED
SPECIMEN SOURCE: NORMAL

## 2021-03-29 ENCOUNTER — TELEPHONE (OUTPATIENT)
Dept: FAMILY MEDICINE | Facility: OTHER | Age: 41
End: 2021-03-29

## 2021-03-29 ENCOUNTER — TELEPHONE (OUTPATIENT)
Dept: EMERGENCY MEDICINE | Facility: HOSPITAL | Age: 41
End: 2021-03-29

## 2021-03-29 ENCOUNTER — HOSPITAL ENCOUNTER (EMERGENCY)
Facility: HOSPITAL | Age: 41
Discharge: HOME | End: 2021-03-29
Attending: NURSE PRACTITIONER | Admitting: NURSE PRACTITIONER
Payer: MEDICARE

## 2021-03-29 VITALS
HEART RATE: 118 BPM | OXYGEN SATURATION: 99 % | TEMPERATURE: 96.8 F | DIASTOLIC BLOOD PRESSURE: 88 MMHG | SYSTOLIC BLOOD PRESSURE: 121 MMHG | RESPIRATION RATE: 12 BRPM

## 2021-03-29 DIAGNOSIS — Z72.51 UNPROTECTED SEX: Primary | ICD-10-CM

## 2021-03-29 PROCEDURE — 250N000013 HC RX MED GY IP 250 OP 250 PS 637: Performed by: NURSE PRACTITIONER

## 2021-03-29 PROCEDURE — 99213 OFFICE O/P EST LOW 20 MIN: CPT | Performed by: NURSE PRACTITIONER

## 2021-03-29 PROCEDURE — G0463 HOSPITAL OUTPT CLINIC VISIT: HCPCS

## 2021-03-29 RX ORDER — LEVONORGESTREL 1.5 MG/1
1.5 TABLET ORAL ONCE
Status: COMPLETED | OUTPATIENT
Start: 2021-03-29 | End: 2021-03-29

## 2021-03-29 RX ADMIN — LEVONORGESTREL 1.5 MG: 1.5 TABLET ORAL at 21:21

## 2021-03-29 ASSESSMENT — ENCOUNTER SYMPTOMS
VOMITING: 0
DIARRHEA: 0
NAUSEA: 0
CHILLS: 0
SHORTNESS OF BREATH: 0
FEVER: 0

## 2021-03-29 NOTE — TELEPHONE ENCOUNTER
Care Transitions focused note:      2nd call attempt.  No answer, message left    Will await call back.    CHINA Cruz
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
## 2021-03-29 NOTE — TELEPHONE ENCOUNTER
Yas call Alla in the ER ext. [phone]. This patient needs an ER follow up but also needs to establish care. Please call ER staff and advise.

## 2021-03-29 NOTE — TELEPHONE ENCOUNTER
Care Transitions focused note:      Follow up phone call made to patient.  Message left.     Will try again later    CHINA Cruz
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
## 2021-03-30 NOTE — DISCHARGE INSTRUCTIONS
Plan B administered    Follow up with primary care provider or return to urgent care/ED with any additional questions or concerns.
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
## 2021-03-30 NOTE — ED NOTES
Discharge instruction reviewed with patient. Pt has no question or concerns and is ready to go home. No questions or concerns.

## 2021-03-30 NOTE — ED TRIAGE NOTES
Pt states she had unprotected sex the last 2 days and would like the plan B medication to prevent pregnancy.

## 2021-03-31 DIAGNOSIS — B19.20 HEPATITIS C: Primary | ICD-10-CM

## 2021-03-31 LAB
HCV RNA SERPL NAA+PROBE-ACNC: NORMAL [IU]/ML
HCV RNA SERPL NAA+PROBE-LOG IU: NORMAL LOG IU/ML

## 2021-04-01 ENCOUNTER — OFFICE VISIT (OUTPATIENT)
Dept: FAMILY MEDICINE | Facility: OTHER | Age: 41
End: 2021-04-01
Attending: FAMILY MEDICINE
Payer: MEDICARE

## 2021-04-01 VITALS
RESPIRATION RATE: 18 BRPM | DIASTOLIC BLOOD PRESSURE: 72 MMHG | HEART RATE: 88 BPM | OXYGEN SATURATION: 98 % | TEMPERATURE: 99.3 F | WEIGHT: 192 LBS | BODY MASS INDEX: 31.95 KG/M2 | SYSTOLIC BLOOD PRESSURE: 109 MMHG

## 2021-04-01 DIAGNOSIS — F32.A DEPRESSION, UNSPECIFIED DEPRESSION TYPE: Primary | ICD-10-CM

## 2021-04-01 PROBLEM — Z87.19 S/P HERNIA REPAIR: Status: ACTIVE | Noted: 2017-09-19

## 2021-04-01 PROBLEM — R11.2 INTRACTABLE VOMITING WITH NAUSEA, UNSPECIFIED VOMITING TYPE: Status: ACTIVE | Noted: 2019-05-13

## 2021-04-01 PROBLEM — M54.50 CHRONIC MIDLINE LOW BACK PAIN WITHOUT SCIATICA: Status: ACTIVE | Noted: 2020-01-06

## 2021-04-01 PROBLEM — M20.22 HALLUX RIGIDUS OF LEFT FOOT: Status: ACTIVE | Noted: 2018-01-12

## 2021-04-01 PROBLEM — G89.29 CHRONIC MIDLINE LOW BACK PAIN WITHOUT SCIATICA: Status: ACTIVE | Noted: 2020-01-06

## 2021-04-01 PROBLEM — Z98.890 S/P HERNIA REPAIR: Status: ACTIVE | Noted: 2017-09-19

## 2021-04-01 PROBLEM — M19.072 OSTEOARTHRITIS OF SUBTALAR JOINT, LEFT: Status: ACTIVE | Noted: 2018-03-07

## 2021-04-01 PROCEDURE — 99207 PR NO CHARGE LOS: CPT | Mod: 25 | Performed by: FAMILY MEDICINE

## 2021-04-01 RX ORDER — EMTRICITABINE AND TENOFOVIR DISOPROXIL FUMARATE 200; 300 MG/1; MG/1
1 TABLET, FILM COATED ORAL
Status: ON HOLD | COMMUNITY
Start: 2020-06-18 | End: 2021-05-25

## 2021-04-01 RX ORDER — DEXTROAMPHETAMINE SACCHARATE, AMPHETAMINE ASPARTATE, DEXTROAMPHETAMINE SULFATE, AND AMPHETAMINE SULFATE 2.5; 2.5; 2.5; 2.5 MG/1; MG/1; MG/1; MG/1
60 TABLET ORAL
Status: ON HOLD | COMMUNITY
End: 2021-05-25

## 2021-04-01 RX ORDER — ARIPIPRAZOLE 20 MG/1
20 TABLET ORAL DAILY
COMMUNITY
End: 2023-06-02

## 2021-04-01 RX ORDER — ACETAMINOPHEN 500 MG
500 TABLET ORAL
Status: ON HOLD | COMMUNITY
Start: 2019-08-15 | End: 2021-05-25

## 2021-04-01 RX ORDER — METHADONE HYDROCHLORIDE 10 MG/1
20 TABLET ORAL
COMMUNITY
End: 2021-04-01

## 2021-04-01 RX ORDER — VITAMIN A ACETATE, BETA CAROTENE, ASCORBIC ACID, CHOLECALCIFEROL, .ALPHA.-TOCOPHEROL ACETATE, DL-, THIAMINE MONONITRATE, RIBOFLAVIN, NIACINAMIDE, PYRIDOXINE HYDROCHLORIDE, FOLIC ACID, CYANOCOBALAMIN, CALCIUM CARBONATE, FERROUS FUMARATE, ZINC OXIDE, CUPRIC OXIDE 3080; 12; 120; 400; 1; 1.84; 3; 20; 22; 920; 25; 200; 27; 10; 2 [IU]/1; UG/1; MG/1; [IU]/1; MG/1; MG/1; MG/1; MG/1; MG/1; [IU]/1; MG/1; MG/1; MG/1; MG/1; MG/1
1 TABLET, FILM COATED ORAL
COMMUNITY
Start: 2020-01-06 | End: 2021-04-01

## 2021-04-01 ASSESSMENT — PAIN SCALES - GENERAL: PAINLEVEL: NO PAIN (0)

## 2021-04-01 NOTE — NURSING NOTE
"Chief Complaint   Patient presents with     Depression     Anxiety     PTSD       Initial /72 (BP Location: Left arm, Patient Position: Chair, Cuff Size: Adult Large)   Pulse 88   Temp 99.3  F (37.4  C) (Tympanic)   Resp 18   Wt 87.1 kg (192 lb)   SpO2 98%   BMI 31.95 kg/m   Estimated body mass index is 31.95 kg/m  as calculated from the following:    Height as of 2/23/21: 1.651 m (5' 5\").    Weight as of this encounter: 87.1 kg (192 lb).  Medication Reconciliation: complete  Elaine Sal LPN  "

## 2021-04-15 ENCOUNTER — ANCILLARY PROCEDURE (OUTPATIENT)
Dept: ULTRASOUND IMAGING | Facility: CLINIC | Age: 41
End: 2021-04-15
Attending: FAMILY MEDICINE
Payer: MEDICARE

## 2021-04-15 ENCOUNTER — APPOINTMENT (OUTPATIENT)
Dept: GENERAL RADIOLOGY | Facility: CLINIC | Age: 41
End: 2021-04-15
Attending: FAMILY MEDICINE
Payer: MEDICARE

## 2021-04-15 ENCOUNTER — HOSPITAL ENCOUNTER (EMERGENCY)
Facility: CLINIC | Age: 41
Discharge: HOME | End: 2021-04-15
Attending: FAMILY MEDICINE | Admitting: FAMILY MEDICINE
Payer: MEDICARE

## 2021-04-15 VITALS
DIASTOLIC BLOOD PRESSURE: 69 MMHG | BODY MASS INDEX: 33.28 KG/M2 | OXYGEN SATURATION: 100 % | SYSTOLIC BLOOD PRESSURE: 112 MMHG | WEIGHT: 200 LBS | HEART RATE: 55 BPM | TEMPERATURE: 98.1 F

## 2021-04-15 DIAGNOSIS — R11.2 NAUSEA VOMITING AND DIARRHEA: ICD-10-CM

## 2021-04-15 DIAGNOSIS — R19.7 NAUSEA VOMITING AND DIARRHEA: ICD-10-CM

## 2021-04-15 LAB
ALBUMIN SERPL-MCNC: 3.8 G/DL (ref 3.4–5)
ALP SERPL-CCNC: 76 U/L (ref 40–150)
ALT SERPL W P-5'-P-CCNC: 21 U/L (ref 0–50)
ANION GAP SERPL CALCULATED.3IONS-SCNC: 7 MMOL/L (ref 3–14)
AST SERPL W P-5'-P-CCNC: 11 U/L (ref 0–45)
BASOPHILS # BLD AUTO: 0 10E9/L (ref 0–0.2)
BASOPHILS NFR BLD AUTO: 0.4 %
BILIRUB SERPL-MCNC: 0.3 MG/DL (ref 0.2–1.3)
BUN SERPL-MCNC: 9 MG/DL (ref 7–30)
CALCIUM SERPL-MCNC: 8.6 MG/DL (ref 8.5–10.1)
CHLORIDE SERPL-SCNC: 110 MMOL/L (ref 94–109)
CO2 SERPL-SCNC: 25 MMOL/L (ref 20–32)
CREAT SERPL-MCNC: 0.79 MG/DL (ref 0.52–1.04)
DIFFERENTIAL METHOD BLD: NORMAL
EOSINOPHIL # BLD AUTO: 0 10E9/L (ref 0–0.7)
EOSINOPHIL NFR BLD AUTO: 0.1 %
ERYTHROCYTE [DISTWIDTH] IN BLOOD BY AUTOMATED COUNT: 12.6 % (ref 10–15)
GFR SERPL CREATININE-BSD FRML MDRD: >90 ML/MIN/{1.73_M2}
GLUCOSE SERPL-MCNC: 121 MG/DL (ref 70–99)
HCG SERPL QL: NEGATIVE
HCT VFR BLD AUTO: 42.8 % (ref 35–47)
HGB BLD-MCNC: 14.2 G/DL (ref 11.7–15.7)
IMM GRANULOCYTES # BLD: 0 10E9/L (ref 0–0.4)
IMM GRANULOCYTES NFR BLD: 0.2 %
LIPASE SERPL-CCNC: 126 U/L (ref 73–393)
LYMPHOCYTES # BLD AUTO: 1 10E9/L (ref 0.8–5.3)
LYMPHOCYTES NFR BLD AUTO: 10.6 %
MCH RBC QN AUTO: 30.9 PG (ref 26.5–33)
MCHC RBC AUTO-ENTMCNC: 33.2 G/DL (ref 31.5–36.5)
MCV RBC AUTO: 93 FL (ref 78–100)
MONOCYTES # BLD AUTO: 0.4 10E9/L (ref 0–1.3)
MONOCYTES NFR BLD AUTO: 3.9 %
NEUTROPHILS # BLD AUTO: 8.1 10E9/L (ref 1.6–8.3)
NEUTROPHILS NFR BLD AUTO: 84.8 %
NRBC # BLD AUTO: 0 10*3/UL
NRBC BLD AUTO-RTO: 0 /100
PLATELET # BLD AUTO: 287 10E9/L (ref 150–450)
POTASSIUM SERPL-SCNC: 3.6 MMOL/L (ref 3.4–5.3)
PROT SERPL-MCNC: 7.4 G/DL (ref 6.8–8.8)
RBC # BLD AUTO: 4.6 10E12/L (ref 3.8–5.2)
SODIUM SERPL-SCNC: 142 MMOL/L (ref 133–144)
WBC # BLD AUTO: 9.5 10E9/L (ref 4–11)

## 2021-04-15 PROCEDURE — 250N000013 HC RX MED GY IP 250 OP 250 PS 637: Performed by: FAMILY MEDICINE

## 2021-04-15 PROCEDURE — 99285 EMERGENCY DEPT VISIT HI MDM: CPT | Mod: 25 | Performed by: FAMILY MEDICINE

## 2021-04-15 PROCEDURE — 96374 THER/PROPH/DIAG INJ IV PUSH: CPT | Performed by: FAMILY MEDICINE

## 2021-04-15 PROCEDURE — 258N000003 HC RX IP 258 OP 636: Performed by: FAMILY MEDICINE

## 2021-04-15 PROCEDURE — 96361 HYDRATE IV INFUSION ADD-ON: CPT | Performed by: FAMILY MEDICINE

## 2021-04-15 PROCEDURE — 83690 ASSAY OF LIPASE: CPT | Performed by: FAMILY MEDICINE

## 2021-04-15 PROCEDURE — 85025 COMPLETE CBC W/AUTO DIFF WBC: CPT | Performed by: FAMILY MEDICINE

## 2021-04-15 PROCEDURE — 250N000011 HC RX IP 250 OP 636: Performed by: FAMILY MEDICINE

## 2021-04-15 PROCEDURE — 80053 COMPREHEN METABOLIC PANEL: CPT | Performed by: FAMILY MEDICINE

## 2021-04-15 PROCEDURE — 84703 CHORIONIC GONADOTROPIN ASSAY: CPT | Performed by: FAMILY MEDICINE

## 2021-04-15 PROCEDURE — 74019 RADEX ABDOMEN 2 VIEWS: CPT

## 2021-04-15 PROCEDURE — C9113 INJ PANTOPRAZOLE SODIUM, VIA: HCPCS | Performed by: FAMILY MEDICINE

## 2021-04-15 PROCEDURE — 96372 THER/PROPH/DIAG INJ SC/IM: CPT | Mod: 59 | Performed by: FAMILY MEDICINE

## 2021-04-15 PROCEDURE — 76705 ECHO EXAM OF ABDOMEN: CPT | Mod: 26 | Performed by: FAMILY MEDICINE

## 2021-04-15 PROCEDURE — 96375 TX/PRO/DX INJ NEW DRUG ADDON: CPT | Performed by: FAMILY MEDICINE

## 2021-04-15 PROCEDURE — 76705 ECHO EXAM OF ABDOMEN: CPT | Performed by: FAMILY MEDICINE

## 2021-04-15 PROCEDURE — 250N000009 HC RX 250: Performed by: FAMILY MEDICINE

## 2021-04-15 PROCEDURE — 96376 TX/PRO/DX INJ SAME DRUG ADON: CPT | Performed by: FAMILY MEDICINE

## 2021-04-15 RX ORDER — DIPHENHYDRAMINE HYDROCHLORIDE 50 MG/ML
25 INJECTION INTRAMUSCULAR; INTRAVENOUS ONCE
Status: COMPLETED | OUTPATIENT
Start: 2021-04-15 | End: 2021-04-15

## 2021-04-15 RX ORDER — ONDANSETRON 2 MG/ML
4 INJECTION INTRAMUSCULAR; INTRAVENOUS ONCE
Status: COMPLETED | OUTPATIENT
Start: 2021-04-15 | End: 2021-04-15

## 2021-04-15 RX ORDER — ONDANSETRON 2 MG/ML
8 INJECTION INTRAMUSCULAR; INTRAVENOUS ONCE
Status: COMPLETED | OUTPATIENT
Start: 2021-04-15 | End: 2021-04-15

## 2021-04-15 RX ORDER — DEXTROAMPHETAMINE SACCHARATE, AMPHETAMINE ASPARTATE MONOHYDRATE, DEXTROAMPHETAMINE SULFATE AND AMPHETAMINE SULFATE 7.5; 7.5; 7.5; 7.5 MG/1; MG/1; MG/1; MG/1
30 CAPSULE, EXTENDED RELEASE ORAL ONCE
Status: COMPLETED | OUTPATIENT
Start: 2021-04-15 | End: 2021-04-15

## 2021-04-15 RX ORDER — ONDANSETRON 4 MG/1
4-8 TABLET, ORALLY DISINTEGRATING ORAL EVERY 8 HOURS PRN
Qty: 12 TABLET | Refills: 0 | Status: SHIPPED | OUTPATIENT
Start: 2021-04-15 | End: 2021-04-18

## 2021-04-15 RX ORDER — OLANZAPINE 10 MG/2ML
10 INJECTION, POWDER, FOR SOLUTION INTRAMUSCULAR ONCE
Status: COMPLETED | OUTPATIENT
Start: 2021-04-15 | End: 2021-04-15

## 2021-04-15 RX ADMIN — DEXTROAMPHETAMINE SACCHARATE, AMPHETAMINE ASPARTATE MONOHYDRATE, DEXTROAMPHETAMINE SULFATE, AMPHETAMINE SULFATE 30 MG: 7.5; 7.5; 7.5; 7.5 CAPSULE, EXTENDED RELEASE ORAL at 15:58

## 2021-04-15 RX ADMIN — DIPHENHYDRAMINE HYDROCHLORIDE 25 MG: 50 INJECTION, SOLUTION INTRAMUSCULAR; INTRAVENOUS at 18:57

## 2021-04-15 RX ADMIN — PANTOPRAZOLE SODIUM 40 MG: 40 INJECTION, POWDER, FOR SOLUTION INTRAVENOUS at 14:27

## 2021-04-15 RX ADMIN — ONDANSETRON 4 MG: 2 INJECTION INTRAMUSCULAR; INTRAVENOUS at 18:57

## 2021-04-15 RX ADMIN — OLANZAPINE 10 MG: 10 INJECTION, POWDER, FOR SOLUTION INTRAMUSCULAR at 14:55

## 2021-04-15 RX ADMIN — LIDOCAINE HYDROCHLORIDE 30 ML: 20 SOLUTION ORAL; TOPICAL at 17:45

## 2021-04-15 RX ADMIN — DIPHENHYDRAMINE HYDROCHLORIDE 25 MG: 50 INJECTION, SOLUTION INTRAMUSCULAR; INTRAVENOUS at 14:24

## 2021-04-15 RX ADMIN — ONDANSETRON 8 MG: 2 INJECTION INTRAMUSCULAR; INTRAVENOUS at 14:26

## 2021-04-15 RX ADMIN — SODIUM CHLORIDE 1000 ML: 9 INJECTION, SOLUTION INTRAVENOUS at 14:38
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
## 2021-04-16 NOTE — DISCHARGE INSTRUCTIONS
Return to the Emergency Room if the following occurs:     Severely worsened pain, fever >101, dehydration, or for any concern at anytime.    Or, follow-up with the following provider as we discussed:     Return to your primary doctor for reevaluation.    Medications discussed:    Zofran for nausea, as needed.    If you received pain-relieving or sedating medication during your time in the ER, avoid alcohol, driving automobiles, or working with machinery.  Also, a responsible adult must stay with you.        Call the Nurse Advice Line at [phone] or [phone] for any concern at anytime.

## 2021-05-24 ENCOUNTER — HOSPITAL ENCOUNTER (INPATIENT)
Facility: HOSPITAL | Age: 41
LOS: 1 days | Discharge: HOME | DRG: 885 | End: 2021-05-25
Attending: EMERGENCY MEDICINE | Admitting: PSYCHIATRY & NEUROLOGY
Payer: MEDICARE

## 2021-05-24 DIAGNOSIS — F31.9 BIPOLAR AFFECTIVE DISORDER (H): ICD-10-CM

## 2021-05-24 DIAGNOSIS — F31.9 BIPOLAR AFFECTIVE DISORDER, REMISSION STATUS UNSPECIFIED (H): ICD-10-CM

## 2021-05-24 DIAGNOSIS — F31.9 BIPOLAR 1 DISORDER (H): Primary | ICD-10-CM

## 2021-05-24 DIAGNOSIS — R45.851 SUICIDAL IDEATION: ICD-10-CM

## 2021-05-24 PROCEDURE — C9803 HOPD COVID-19 SPEC COLLECT: HCPCS

## 2021-05-24 PROCEDURE — 99285 EMERGENCY DEPT VISIT HI MDM: CPT

## 2021-05-24 PROCEDURE — 99285 EMERGENCY DEPT VISIT HI MDM: CPT | Performed by: EMERGENCY MEDICINE

## 2021-05-25 VITALS
SYSTOLIC BLOOD PRESSURE: 127 MMHG | HEART RATE: 95 BPM | BODY MASS INDEX: 30.44 KG/M2 | OXYGEN SATURATION: 97 % | TEMPERATURE: 99.6 F | WEIGHT: 182.7 LBS | RESPIRATION RATE: 16 BRPM | DIASTOLIC BLOOD PRESSURE: 86 MMHG | HEIGHT: 65 IN

## 2021-05-25 PROBLEM — R45.851 SUICIDAL IDEATION: Status: ACTIVE | Noted: 2021-05-25

## 2021-05-25 LAB
AMPHETAMINES UR QL: ABNORMAL NG/ML
ANION GAP SERPL CALCULATED.3IONS-SCNC: 7 MMOL/L (ref 3–14)
APAP SERPL-MCNC: <2 MG/L (ref 10–20)
BARBITURATES UR QL SCN: NOT DETECTED NG/ML
BASOPHILS # BLD AUTO: 0.1 10E9/L (ref 0–0.2)
BASOPHILS NFR BLD AUTO: 0.8 %
BENZODIAZ UR QL SCN: NOT DETECTED NG/ML
BUN SERPL-MCNC: 12 MG/DL (ref 7–30)
BUPRENORPHINE UR QL: ABNORMAL NG/ML
C TRACH DNA SPEC QL NAA+PROBE: NOT DETECTED
CALCIUM SERPL-MCNC: 9 MG/DL (ref 8.5–10.1)
CANNABINOIDS UR QL: ABNORMAL NG/ML
CHLORIDE SERPL-SCNC: 106 MMOL/L (ref 94–109)
CO2 SERPL-SCNC: 26 MMOL/L (ref 20–32)
COCAINE UR QL SCN: NOT DETECTED NG/ML
CREAT SERPL-MCNC: 0.74 MG/DL (ref 0.52–1.04)
D-METHAMPHET UR QL: NOT DETECTED NG/ML
DIFFERENTIAL METHOD BLD: NORMAL
EOSINOPHIL # BLD AUTO: 0.2 10E9/L (ref 0–0.7)
EOSINOPHIL NFR BLD AUTO: 2.3 %
ERYTHROCYTE [DISTWIDTH] IN BLOOD BY AUTOMATED COUNT: 11.9 % (ref 10–15)
ETHANOL SERPL-MCNC: <0.01 G/DL
GFR SERPL CREATININE-BSD FRML MDRD: >90 ML/MIN/{1.73_M2}
GLUCOSE SERPL-MCNC: 96 MG/DL (ref 70–99)
HCG UR QL: NEGATIVE
HCT VFR BLD AUTO: 44.6 % (ref 35–47)
HGB BLD-MCNC: 14.9 G/DL (ref 11.7–15.7)
IMM GRANULOCYTES # BLD: 0 10E9/L (ref 0–0.4)
IMM GRANULOCYTES NFR BLD: 0.4 %
LABORATORY COMMENT REPORT: NORMAL
LYMPHOCYTES # BLD AUTO: 2.5 10E9/L (ref 0.8–5.3)
LYMPHOCYTES NFR BLD AUTO: 29.4 %
MCH RBC QN AUTO: 30.3 PG (ref 26.5–33)
MCHC RBC AUTO-ENTMCNC: 33.4 G/DL (ref 31.5–36.5)
MCV RBC AUTO: 91 FL (ref 78–100)
METHADONE UR QL SCN: NOT DETECTED NG/ML
MONOCYTES # BLD AUTO: 0.5 10E9/L (ref 0–1.3)
MONOCYTES NFR BLD AUTO: 5.5 %
N GONORRHOEA DNA SPEC QL NAA+PROBE: NOT DETECTED
NEUTROPHILS # BLD AUTO: 5.2 10E9/L (ref 1.6–8.3)
NEUTROPHILS NFR BLD AUTO: 61.6 %
NRBC # BLD AUTO: 0 10*3/UL
NRBC BLD AUTO-RTO: 0 /100
OPIATES UR QL SCN: NOT DETECTED NG/ML
OXYCODONE UR QL SCN: NOT DETECTED NG/ML
PCP UR QL SCN: NOT DETECTED NG/ML
PLATELET # BLD AUTO: 350 10E9/L (ref 150–450)
POTASSIUM SERPL-SCNC: 4.3 MMOL/L (ref 3.4–5.3)
PROPOXYPH UR QL: NOT DETECTED NG/ML
RBC # BLD AUTO: 4.91 10E12/L (ref 3.8–5.2)
SALICYLATES SERPL-MCNC: 2 MG/DL
SARS-COV-2 RNA RESP QL NAA+PROBE: NEGATIVE
SODIUM SERPL-SCNC: 139 MMOL/L (ref 133–144)
SPECIMEN SOURCE: NORMAL
SPECIMEN SOURCE: NORMAL
TRICYCLICS UR QL SCN: NOT DETECTED NG/ML
WBC # BLD AUTO: 8.4 10E9/L (ref 4–11)

## 2021-05-25 PROCEDURE — 80048 BASIC METABOLIC PNL TOTAL CA: CPT | Performed by: EMERGENCY MEDICINE

## 2021-05-25 PROCEDURE — U0005 INFEC AGEN DETEC AMPLI PROBE: HCPCS | Performed by: EMERGENCY MEDICINE

## 2021-05-25 PROCEDURE — 250N000013 HC RX MED GY IP 250 OP 250 PS 637: Performed by: EMERGENCY MEDICINE

## 2021-05-25 PROCEDURE — 250N000013 HC RX MED GY IP 250 OP 250 PS 637: Performed by: NURSE PRACTITIONER

## 2021-05-25 PROCEDURE — 36415 COLL VENOUS BLD VENIPUNCTURE: CPT | Performed by: EMERGENCY MEDICINE

## 2021-05-25 PROCEDURE — 80179 DRUG ASSAY SALICYLATE: CPT | Performed by: EMERGENCY MEDICINE

## 2021-05-25 PROCEDURE — C9803 HOPD COVID-19 SPEC COLLECT: HCPCS

## 2021-05-25 PROCEDURE — 99235 HOSP IP/OBS SAME DATE MOD 70: CPT | Performed by: NURSE PRACTITIONER

## 2021-05-25 PROCEDURE — 87491 CHLMYD TRACH DNA AMP PROBE: CPT | Performed by: EMERGENCY MEDICINE

## 2021-05-25 PROCEDURE — 81025 URINE PREGNANCY TEST: CPT | Performed by: EMERGENCY MEDICINE

## 2021-05-25 PROCEDURE — 82077 ASSAY SPEC XCP UR&BREATH IA: CPT | Performed by: EMERGENCY MEDICINE

## 2021-05-25 PROCEDURE — U0003 INFECTIOUS AGENT DETECTION BY NUCLEIC ACID (DNA OR RNA); SEVERE ACUTE RESPIRATORY SYNDROME CORONAVIRUS 2 (SARS-COV-2) (CORONAVIRUS DISEASE [COVID-19]), AMPLIFIED PROBE TECHNIQUE, MAKING USE OF HIGH THROUGHPUT TECHNOLOGIES AS DESCRIBED BY CMS-2020-01-R: HCPCS | Performed by: EMERGENCY MEDICINE

## 2021-05-25 PROCEDURE — 80143 DRUG ASSAY ACETAMINOPHEN: CPT | Performed by: EMERGENCY MEDICINE

## 2021-05-25 PROCEDURE — 85025 COMPLETE CBC W/AUTO DIFF WBC: CPT | Performed by: EMERGENCY MEDICINE

## 2021-05-25 PROCEDURE — 80306 DRUG TEST PRSMV INSTRMNT: CPT | Performed by: EMERGENCY MEDICINE

## 2021-05-25 PROCEDURE — 124N000001 HC R&B MH

## 2021-05-25 PROCEDURE — 87591 N.GONORRHOEAE DNA AMP PROB: CPT | Performed by: EMERGENCY MEDICINE

## 2021-05-25 RX ORDER — CITALOPRAM HYDROBROMIDE 20 MG/1
20 TABLET ORAL DAILY
Status: CANCELLED | COMMUNITY
Start: 2021-05-25

## 2021-05-25 RX ORDER — ARIPIPRAZOLE 15 MG/1
15 TABLET ORAL DAILY
Status: CANCELLED | COMMUNITY
Start: 2021-05-25

## 2021-05-25 RX ORDER — BUPRENORPHINE AND NALOXONE 8; 2 MG/1; MG/1
1 FILM, SOLUBLE BUCCAL; SUBLINGUAL 2 TIMES DAILY
Status: DISCONTINUED | OUTPATIENT
Start: 2021-05-25 | End: 2021-05-25 | Stop reason: HOSPADM

## 2021-05-25 RX ORDER — LORAZEPAM 1 MG/1
1 TABLET ORAL ONCE
Status: COMPLETED | OUTPATIENT
Start: 2021-05-25 | End: 2021-05-25

## 2021-05-25 RX ORDER — LANOLIN ALCOHOL/MO/W.PET/CERES
3-6 CREAM (GRAM) TOPICAL
Status: DISCONTINUED | OUTPATIENT
Start: 2021-05-25 | End: 2021-05-25 | Stop reason: HOSPADM

## 2021-05-25 RX ORDER — CITALOPRAM HYDROBROMIDE 20 MG/1
20 TABLET ORAL DAILY
Status: DISCONTINUED | OUTPATIENT
Start: 2021-05-25 | End: 2021-05-25 | Stop reason: HOSPADM

## 2021-05-25 RX ORDER — BUPRENORPHINE AND NALOXONE 2; .5 MG/1; MG/1
1 FILM, SOLUBLE BUCCAL; SUBLINGUAL DAILY
Status: DISCONTINUED | OUTPATIENT
Start: 2021-05-25 | End: 2021-05-25 | Stop reason: DRUGHIGH

## 2021-05-25 RX ORDER — DEXTROAMPHETAMINE SACCHARATE, AMPHETAMINE ASPARTATE MONOHYDRATE, DEXTROAMPHETAMINE SULFATE AND AMPHETAMINE SULFATE 2.5; 2.5; 2.5; 2.5 MG/1; MG/1; MG/1; MG/1
30 CAPSULE, EXTENDED RELEASE ORAL DAILY
Status: DISCONTINUED | OUTPATIENT
Start: 2021-05-25 | End: 2021-05-25 | Stop reason: HOSPADM

## 2021-05-25 RX ORDER — AMOXICILLIN 250 MG
1 CAPSULE ORAL 2 TIMES DAILY PRN
Status: DISCONTINUED | OUTPATIENT
Start: 2021-05-25 | End: 2021-05-25 | Stop reason: HOSPADM

## 2021-05-25 RX ORDER — BUPRENORPHINE AND NALOXONE 8; 2 MG/1; MG/1
1 FILM, SOLUBLE BUCCAL; SUBLINGUAL 2 TIMES DAILY
COMMUNITY
End: 2023-06-02

## 2021-05-25 RX ORDER — METFORMIN HCL 500 MG
500 TABLET, EXTENDED RELEASE 24 HR ORAL
Qty: 30 TABLET | Refills: 0 | Status: SHIPPED | OUTPATIENT
Start: 2021-05-25 | End: 2023-06-02

## 2021-05-25 RX ORDER — METFORMIN HCL 500 MG
500 TABLET, EXTENDED RELEASE 24 HR ORAL
Status: DISCONTINUED | OUTPATIENT
Start: 2021-05-25 | End: 2021-05-25 | Stop reason: HOSPADM

## 2021-05-25 RX ORDER — MAGNESIUM HYDROXIDE/ALUMINUM HYDROXICE/SIMETHICONE 120; 1200; 1200 MG/30ML; MG/30ML; MG/30ML
30 SUSPENSION ORAL EVERY 4 HOURS PRN
Status: DISCONTINUED | OUTPATIENT
Start: 2021-05-25 | End: 2021-05-25 | Stop reason: HOSPADM

## 2021-05-25 RX ORDER — LORAZEPAM 0.5 MG/1
0.5 TABLET ORAL ONCE
Status: COMPLETED | OUTPATIENT
Start: 2021-05-25 | End: 2021-05-25

## 2021-05-25 RX ORDER — ACETAMINOPHEN 325 MG/1
650 TABLET ORAL EVERY 4 HOURS PRN
Status: DISCONTINUED | OUTPATIENT
Start: 2021-05-25 | End: 2021-05-25 | Stop reason: HOSPADM

## 2021-05-25 RX ORDER — LORAZEPAM 0.5 MG/1
0.5 TABLET ORAL ONCE
Status: DISCONTINUED | OUTPATIENT
Start: 2021-05-25 | End: 2021-05-25

## 2021-05-25 RX ORDER — HYDROXYZINE HYDROCHLORIDE 10 MG/1
30-50 TABLET, FILM COATED ORAL EVERY 4 HOURS PRN
Status: DISCONTINUED | OUTPATIENT
Start: 2021-05-25 | End: 2021-05-25 | Stop reason: HOSPADM

## 2021-05-25 RX ORDER — TRAZODONE HYDROCHLORIDE 50 MG/1
50 TABLET, FILM COATED ORAL
Status: DISCONTINUED | OUTPATIENT
Start: 2021-05-25 | End: 2021-05-25 | Stop reason: HOSPADM

## 2021-05-25 RX ORDER — ARIPIPRAZOLE 15 MG/1
15 TABLET ORAL DAILY
Status: DISCONTINUED | OUTPATIENT
Start: 2021-05-25 | End: 2021-05-25 | Stop reason: HOSPADM

## 2021-05-25 RX ADMIN — LORAZEPAM 0.5 MG: 0.5 TABLET ORAL at 01:16

## 2021-05-25 RX ADMIN — DEXTROAMPHETAMINE SACCHARATE, AMPHETAMINE ASPARTATE MONOHYDRATE, DEXTROAMPHETAMINE SULFATE, AMPHETAMINE SULFATE 30 MG: 2.5; 2.5; 2.5; 2.5 CAPSULE, EXTENDED RELEASE ORAL at 11:29

## 2021-05-25 RX ADMIN — NICOTINE POLACRILEX 2 MG: 2 GUM, CHEWING ORAL at 09:11

## 2021-05-25 RX ADMIN — NICOTINE POLACRILEX 2 MG: 2 GUM, CHEWING ORAL at 05:09

## 2021-05-25 RX ADMIN — BUPRENORPHINE AND NALOXONE 1 FILM: 8; 2 FILM, SOLUBLE BUCCAL; SUBLINGUAL at 11:16

## 2021-05-25 RX ADMIN — ARIPIPRAZOLE 15 MG: 15 TABLET ORAL at 11:16

## 2021-05-25 RX ADMIN — LORAZEPAM 1 MG: 1 TABLET ORAL at 02:49

## 2021-05-25 RX ADMIN — CITALOPRAM HYDROBROMIDE 20 MG: 20 TABLET ORAL at 11:16

## 2021-05-25 RX ADMIN — NICOTINE POLACRILEX 2 MG: 2 GUM, CHEWING ORAL at 04:09

## 2021-05-25 ASSESSMENT — ENCOUNTER SYMPTOMS
ENDOCRINE NEGATIVE: 1
SHORTNESS OF BREATH: 0
FEVER: 0
NERVOUS/ANXIOUS: 1
CARDIOVASCULAR NEGATIVE: 1
GASTROINTESTINAL NEGATIVE: 1
MUSCULOSKELETAL NEGATIVE: 1
HEMATOLOGIC/LYMPHATIC NEGATIVE: 1
RESPIRATORY NEGATIVE: 1
EYES NEGATIVE: 1
CHILLS: 0
ALLERGIC/IMMUNOLOGIC NEGATIVE: 1
CONSTITUTIONAL NEGATIVE: 1
NEUROLOGICAL NEGATIVE: 1

## 2021-05-25 ASSESSMENT — ACTIVITIES OF DAILY LIVING (ADL)
HYGIENE/GROOMING: INDEPENDENT
DRESS: SCRUBS (BEHAVIORAL HEALTH);INDEPENDENT
ORAL_HYGIENE: INDEPENDENT

## 2021-05-25 ASSESSMENT — MIFFLIN-ST. JEOR: SCORE: 1509.6

## 2021-05-25 NOTE — PROGRESS NOTES
05/25/21 0552   Patient Belongings   Did you bring any home meds/supplements to the hospital?  No   Patient Belongings locker   Patient Belongings Put in Hospital Secure Location (Security or Locker, etc.) other (see comments)  (T shirt, Skirt, Black boots, Vest)   Belongings Search Yes   Clothing Search Yes   Second Staff Devi RN   List items sent to safe: Phone w/ case and pop socket ( cracked screen), 2x bracelets, necklace w/ 3 pendants   All other belongings put in assigned cubby in belongings room.   I have reviewed my belongings list on admission and verify that it is correct.     Patient signature_______________________________    Second staff witness (if patient unable to sign) ______________________________       I have received all my belongings at discharge.    Patient signature________________________________    KARI Wong  5/25/2021  5:54 AM
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
## 2021-05-25 NOTE — PLAN OF CARE
Problem: Behavioral Health Plan of Care  Goal: Patient-Specific Goal (Individualization)  Description: Pt will attend at least 50% of group.  Pt will slept at least 6-8 hours per night.  Pt will be compliant with ordered medications and treatment team recommendations.    5/25/2021 1219 by Alondra Munoz, RN  Outcome: Adequate for Discharge  Note:        Problem: Suicidal Behavior  Goal: Suicidal Behavior is Absent or Managed  Description: Pt will be free from self harm during hospital stay.  Pt will verbalize decrease suicide Ideation by discharge.  Pt will verbalize suicide prevention plan by discharge.   5/25/2021 1219 by Alondra Munoz, RN  Outcome: Adequate for Discharge     Problem: Fall Injury Risk  Goal: Absence of Fall and Fall-Related Injury  5/25/2021 1219 by Alondra Munoz, RN  Outcome: Adequate for Discharge

## 2021-05-25 NOTE — PLAN OF CARE
Discharge Note    Patient Discharged to home on 5/25/2021 12:15 PM via No transportation concerns.     Patient informed of discharge instructions in AVS. patient verbalizes understanding and denies having any questions pertaining to AVS. Patient stable at time of discharge. Patient denies SI, HI, and thoughts of self harm at time of discharge. All personal belongings returned to patient. Discharge prescriptions sent to [facility] via electronic communication.     Alondra Munoz RN  5/25/2021  12:20 PM

## 2021-05-25 NOTE — PLAN OF CARE
Prior to Admission Medication Reconciliation:     Medications added:   [x] None  [] As listed below:    Medications deleted:   [] None  [x] As listed below:    apap    truvada    Medications marked for review/removal by attending:  [x] None  [] As listed below:    Changes made to existing medications:   [] None  [x] As listed below:    abilify from 15 mg to 20 mg    suboxone from 2-0.5 daily to 8-2 BID    adderall from scheduled to prn    celexa from 20 mg to 30 mg    Last times/dates taken verified with patient:  [x] Yes- completed myself  [] Prepared PTA medlist for review only. (will not be available to review personally)  [] Did not review with patient. Rx verification only. Review completed by nursing.    [] Nurse completed no changes made (double checked entries)  [] Unable to review with patient at this time:  [] Nurse completed/changes made:     Allergies listed at another location:  []Not applicable   []See below:    Allergy review:    [x]Did not review: reviewed by nursing  []Did not review: pt unable at this time  []Patient/MAR verified NKDA  []Patient/MAR verified current existing allergies: no changes made    Medication reconciliation sources:   [x]Patient  []Patient family member/emergency contact: **  [][facility]  []Epic Chart Review  []Care Everywhere review  [x]Pharmacy med list: [facility]   Name Strength Instructions Last Fill Date QTY/DS Notes   [x] abilify  20mg Daily  4/26/21     [x] citalopram 20 mg 30 mg daily  4/26/21     [] flexeril 10 mg 1 tab TID PRN 2/25/21  Therapy complete per pt   [x] D-amphetamine er 30 mg Daily prn  3/3/21     [x] Ibuprofen  800 mg q8h prn pain 2/25/21     [x] suboxone 8 mg/2mg 1 film SL BID 5/7/21     [] valacyclovir 1 GM BID 2/23/21  Therapy complete per pt      []Pharmacy phone call  []Outside meds dispense report  []Nursing home or Assisted Living MAR:  []Other: **    Pharmacy desired at discharge: [facility]    Is patient on  coumadin?  [x]No      Requests for consultation by provider or pharmacist:   [x] Patient understands why all of their meds were prescribed and how to take them. No questions.   [] Managing party has no questions.   [] Patient/ managing party has questions about the following:  [] Did not review with patient. Cannot assess.     Fill dates and reported compliancy:  [x] Fill dates coincide with compliancy for all/most maintenance meds.   [] Fill dates do not coincide with compliancy with maintenance meds. See notes in PTA medlist and in comments.    [] Fill dates do not coincide with the following medications but pt reports compliancy:  [] Did not review with patient. Cannot assess.     Historian accuracy:  [x] Excellent- alert and oriented, understands why meds were prescribed and how to take, able to answer specifics  [] Good- alert and oriented, understands why meds were prescribed and how to take, some confusion   [] Fair- alert and oriented, doesn't know medications without list, cannot answer specifics about medications, but has a decent process for which to take at home  [] Poor- does not know medications, may not have a process to take at home, may be cognitively unable to review at this time  []Medication management done by family member or facility, no concerns about historian accuracy.   [] Did not review with patient. Cannot assess.     Medication Management:  [x] Manages meds independently  [] Family member/ other party manages meds:  [] Meds managed by staff at facility  [] Meds set up by home care, family/other party helps administer  [] Meds set up by home care, self administers  [] Did not review with patient. Cannot assess.     Other medications aside from PTA:  [x] Denies taking any medications aside from those listed in PTA meds  [] Reports taking another medication(s) but cannot recall the name(s)  [] Refuses to say.  [] Did not review with patient. Cannot assess.     Comments: none.     Rain  Harry on 5/25/2021 at 9:48 AM       Discrepancies: [x] No []Not Applicable []Yes: listed below    Time spent on medication reconciliation:   []5-20 mins  [x]20-40 mins  []> 40 mins    Issues completing PTA medication reconciliation:  [] On hold for a long time  [] Waited for a call back  [] Fax didn't come through  [x] Fax took a long time  [] Other:    Notifying appropriate party of changes/additions/discrepancies:  []No pertinent changes made, notification not necessary.   [x] Notified attending provider via text page/phone call  [] Notified attending provider in person  [] Notified pharmacy  [] Notified nurse  [] Attending provider not available, left detailed notes  [] Pt is not admitted to floor yet, PTA meds completed before admission.     Medications Prior to Admission   Medication Sig Dispense Refill Last Dose     amphetamine-dextroamphetamine (ADDERALL XR) 30 MG 24 hr capsule Take 30 mg by mouth daily as needed   0 5/24/2021 at Unknown time     ARIPiprazole (ABILIFY) 20 MG tablet Take 20 mg by mouth daily    Past Month at AM     buprenorphine HCl-naloxone HCl (SUBOXONE) 8-2 MG per film Place 1 Film under the tongue 2 times daily   5/24/2021 at AM/PM     citalopram (CELEXA) 20 MG tablet Take 30 mg by mouth daily    Past Month at AM     ibuprofen (ADVIL/MOTRIN) 800 MG tablet Take 1 tablet (800 mg) by mouth every 8 hours as needed for moderate pain 24 tablet 0 Past Month at Unknown time     medical cannabis (Patient's own supply) Patient reports 10 mg capsules taken as needed.   Unknown at Unknown time

## 2021-05-25 NOTE — DISCHARGE INSTRUCTIONS
"Behavioral Discharge Planning and Instructions    Summary: Pt admitted due to flushing her meds because they \"made her gain weight\" and increased SI.     Main Diagnosis:     Health Care Follow-up:     [facility]  [address] [place] [address]  Phone:  [phone]   Fax - [phone]    [facility]  [address] [facility]  [place], [place] [address] [phone]  [phone]    [facility]  [address] [place], [place] [id number] [phone]  Fax: [phone]    [facility]   [address] [facility] [place] [address]  Phone: [phone]   Fax: [phone]    Attend all scheduled appointments with your outpatient providers. Call at least 24 hours in advance if you need to reschedule an appointment to ensure continued access to your outpatient providers.     Major Treatments, Procedures and Findings:  You were provided with: a psychiatric assessment, assessed for medical stability, medication evaluation and/or management, group therapy, family therapy, individual therapy, CD evaluation/assessment, milieu management and medical interventions    Symptoms to Report: feeling more aggressive, increased confusion, losing more sleep, mood getting worse or thoughts of suicide    Early warning signs can include: increased depression or anxiety sleep disturbances increased thoughts or behaviors of suicide or self-harm  increased unusual thinking, such as paranoia or hearing voices    Safety and Wellness:  Take all medicines as directed.  Make no changes unless your doctor suggests them.      Follow treatment recommendations.  Refrain from alcohol and non-prescribed drugs.  Ask your support system to help you reduce your access to items that could harm yourself or others. Items could include:  Firearms  Medicines (both prescribed and over-the-counter)  Knives and other sharp objects  Ropes and like materials  Car keys  If there is a concern for safety, call 911. If " there is a concern for safety, call 911.    Resources:   Crisis Intervention: 120.423.4656 or 201-450-9005 (TTY: 613.567.7787).  Call anytime for help.  National [place] on Mental Illness (www.[place].parminder.org): 938.827.2923 or 291-273-3116.  [place] Association for Children's Mental Health (www.mac.org): 986.735.7310.  Alcoholics Anonymous (www.alcoholics-anonymous.org): Check your phone book for your local chapter.  Suicide Awareness Voices of Education (SAVE) (www.save.org): 322-707-OADH (3587)  National Suicide Prevention Line (www.mentalhealthmn.org): 010-283-NRWX (9940)  Mental Health Consumer/Survivor Network of [place] (www.mhcsn.net): 549.540.3227 or 835-834-1725  Mental Health Association of [place] (www.mentalhealth.org): 983.556.1254 or 686-167-3404  Self- Management and Recovery Training., SMART-- Toll free: 176.876.7818  www.GI-View  Text 4 Life: txt \"LIFE\" to 30447 for immediate support and crisis intervention  Crisis text line: Text \"[place]\" to 173534. Free, confidential, 24/7.  Crisis Intervention: 305.668.5552 or 866-897-6023. Call anytime for help.     General Medication Instructions:   See your medication sheet(s) for instructions.   Take all medicines as directed.  Make no changes unless your doctor suggests them.   Go to all your doctor visits.  Be sure to have all your required lab tests. This way, your medicines can be refilled on time.  Do not use any drugs not prescribed by your doctor.  Avoid alcohol.    Advance Directives:   Scanned document on file with [facility]? No scanned doc  Is document scanned? Pt states no documents  Honoring Choices Your Rights Handout: Informed and given  Was more information offered? Pt declined    The Treatment team has appreciated the opportunity to work with you. If you have any questions or concerns about your recent admission, you can contact the unit which can receive your call 24 hours a day, 7 days a week. They will be able to get in touch with a Provider if " needed. The unit number is [id number]
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
## 2021-05-25 NOTE — ED PROVIDER NOTES
History     Chief Complaint   Patient presents with     Psychiatric Evaluation     HPI  Melba Cornelius is a 40 year old female who is today for psych evaluation.  Patient states that she has been off her medications for the past 3 weeks and now feels depressed, stressed and feels like she is suicidal.  Patient very tearful and upset.  States that even her dogs feel that she is not her usual self.  Patient denies any homicidal ideation.  Patient denies any self injury.  Allergies:  Allergies   Allergen Reactions     Droperidol Shortness Of Breath     Haldol Anxiety, Difficulty breathing, Muscle Pain (Myalgia), Palpitations, Photosensitivity, Shortness Of Breath and Visual Disturbance     Haloperidol Shortness Of Breath     Other reaction(s): Tetany     Ketorolac Shortness Of Breath     Olanzapine Shortness Of Breath     Other reaction(s): Other (see comments)     Promethazine Other (See Comments) and Shortness Of Breath     anxiety     Bupropion Other (See Comments) and Unknown     Side effect  Side effect.  Became very suicidal and physically ill.  Suicidal thoughts     Inapsine [Butyrophenones]      No Clinical Screening - See Comments      See Scan 11/00 For Multiple Intolerances     Phenothiazine      Wellbutrin [Bupropion Hcl]      Metoclopramide Anxiety       Problem List:    Patient Active Problem List    Diagnosis Date Noted     Chronic midline low back pain without sciatica 01/06/2020     Priority: Medium     Formatting of this note might be different from the original.  HARDWARE in place ?lumbar 3 vertebrae 2/2 compression frxr 2016. elvira       Intractable vomiting with nausea, unspecified vomiting type 05/13/2019     Priority: Medium     Osteoarthritis of subtalar joint, left 03/07/2018     Priority: Medium     Last Assessment & Plan:   Formatting of this note might be different from the original.  37yoF presenting with left ankle pain 2/2 subtalar OA    - Discussed subtalar fusion.   - Educated pt on  risks, benefits, procedure, and recovery associated with surgery  - Informed pt she cannot smoke for 1 month prior to surgery  - Pt wishes to take time to think about it  - RTC PRN       Hallux rigidus of left foot 01/12/2018     Priority: Medium     Last Assessment & Plan:   Formatting of this note might be different from the original.  37yoF 2 weeks s/p cheilectomy with microfracture of 1st metatarsal head, left foot and arthrotomy and debridement, left foot 1st MTP joint.    -Doing well  -Discussed postoperative course and expectations.  - New dressings applied today with steri strips, 4x4 and kerlix wrap.  - Continue WBAT while in post-op shoe  - Pain management with ibuprofen  - Rest, ice, elevate as needed  - Follow up 4 weeks       S/P hernia repair 09/19/2017     Priority: Medium     Abdominal wall hernia 03/28/2017     Priority: Medium     Closed head injury 12/31/2016     Priority: Medium     History of manic depressive disorder 12/31/2016     Priority: Medium     Personality disorder (H) 12/31/2016     Priority: Medium     Self-destructive behavior 12/31/2016     Priority: Medium     Foreign body in stomach 12/29/2016     Priority: Medium     Drug-seeking behavior 09/14/2016     Priority: Medium     Overview:    shows pt picked up 20 tablets of Percocet on 7/5/16 but pt states she hasn't had any narc pain medications since her fall in [place] in early June. States she has been to some ERs but they will only give her ibuprofen or tylenol.       History of suicide attempt 09/14/2016     Priority: Medium     History of migraine headaches 03/15/2016     Priority: Medium     Polysubstance abuse (H) 01/18/2016     Priority: Medium     Overview:   Overview:   THC, methamphetamine, methadone    Used methamphetamine in 2007 and committed for 1 year in [place]  THC 1x/month  Methadone from Dr. Gonzalez (50 mg 2x/day migraine HAs) in [place] - last appt 2013  Overview:   THC, methamphetamine,  methadone    Used methamphetamine in 2007 and committed for 1 year in [place]  THC 1x/month  Methadone from Dr. Gonzalez (50 mg 2x/day migraine HAs) in [facility] appt 2013       Closed pilon fracture of tibia 10/12/2015     Priority: Medium     Mixed, or nondependent drug abuse 06/18/2015     Priority: Medium     Overview:   Overview:   THC, methamphetamine, methadone    Used methamphetamine in 2007 and committed for 1 year in [place]  THC 1x/month  Methadone from Dr. Gonzalez (50 mg 2x/day migraine HAs) in [facility] appt 2013       Amphetamine dependence (H) 05/04/2015     Priority: Medium     Opioid dependence (H) 05/04/2015     Priority: Medium     Alcohol abuse 04/09/2015     Priority: Medium     Amphetamine abuse (H) 04/09/2015     Priority: Medium     Nondependent cannabis abuse, episodic 04/09/2015     Priority: Medium     Generalized anxiety disorder 04/09/2015     Priority: Medium     Opioid abuse (H) 04/09/2015     Priority: Medium     Other stimulant dependence, uncomplicated (H) 04/09/2015     Priority: Medium     Tobacco dependence syndrome 04/09/2015     Priority: Medium     Overview:   Overview:   started age 20, 3 cigs/day       Attention deficit disorder 12/15/2014     Priority: Medium     Overview:   adderall STOPPED by [facility] psychiatrist in July 2014 due to overuse and (+) urine drug screen  Please see UTOX 2/4/2014 - (+) methamphetamine, THC, methadone       Bipolar affective disorder (H) 12/15/2014     Priority: Medium     Concussion injury of body structure 12/15/2014     Priority: Medium     Seizure (H) 12/15/2014     Priority: Medium     Delusions (H) 12/15/2014     Priority: Medium     Delusional disorder (H) 12/15/2014     Priority: Medium     Posttraumatic stress disorder 12/15/2014     Priority: Medium     Spells of decreased attentiveness 12/15/2014     Priority: Medium     Homeless 06/03/2014     Priority: Medium     Panic disorder with agoraphobia  10/31/2013     Priority: Medium     Cannabis abuse 09/02/2013     Priority: Medium     Methadone misuse 09/02/2013     Priority: Medium     Other long term (current) drug therapy 11/19/2012     Priority: Medium     CARDIOVASCULAR SCREENING; LDL GOAL LESS THAN 160 10/31/2010     Priority: Medium     Noncompliance with treatment 01/14/2010     Priority: Medium     Bipolar I disorder (H) 11/01/2009     Priority: Medium     Borderline personality disorder (H) 02/05/2009     Priority: Medium     Cannabis dependence (H) 02/05/2009     Priority: Medium     Psychostimulant dependence in remission (H) 02/05/2009     Priority: Medium     Bipolar I disorder, most recent episode (or current) depressed, severe, specified as with psychotic behavior (H) 02/05/2009     Priority: Medium     Tobacco use 12/31/2008     Priority: Medium     Overview:   started age 20, 3 cigs/day       Auditory hallucinations 12/31/2008     Priority: Medium     (Problem list name updated by automated process. Provider to review and confirm.)       Acne 12/31/2008     Priority: Medium     Depression 05/15/2006     Priority: Medium     Headache 05/15/2006     Priority: Medium     Intractable migraine 05/15/2006     Priority: Medium     Cervical syndrome 05/15/2006     Priority: Medium     Intractable migraine      Priority: Medium     Problem list name updated by automated process. Provider to review       Depressive disorder, not elsewhere classified      Priority: Medium        Past Medical History:    Past Medical History:   Diagnosis Date     Acne      Major depressive disorder      Migraine headache      Polysubstance abuse (H) 2011     Smoker      Suicide attempt (H) 2007       Past Surgical History:    Past Surgical History:   Procedure Laterality Date     BACK SURGERY       C LT X-RAY HEEL      surgery     HC REMOVE TONSILS/ADENOIDS,14+ Y/O      T & A 14+y.o.     HC TOOTH EXTRACTION W/FORCEP         Family History:    Family History   Problem  Relation Age of Onset     Diabetes Maternal Aunt      Heart Disease Maternal Grandmother          at 55     Cancer No family hx of      Neurologic Disorder No family hx of        Social History:  Marital Status:  Single [1]  Social History     Tobacco Use     Smoking status: Current Every Day Smoker     Packs/day: 0.25     Types: Cigarettes     Smokeless tobacco: Never Used     Tobacco comment: TRYING TO QUIT, pt declined   Substance Use Topics     Alcohol use: No     Alcohol/week: 0.0 standard drinks     Drug use: No        Medications:    amphetamine-dextroamphetamine (ADDERALL XR) 20 MG 24 hr capsule  buprenorphine HCl-naloxone HCl (SUBOXONE) 2-0.5 MG per film  acetaminophen (TYLENOL) 500 MG tablet  amphetamine-dextroamphetamine (ADDERALL) 10 MG tablet  ARIPiprazole (ABILIFY) 15 MG tablet  CITALOPRAM HYDROBROMIDE PO  emtricitabine-tenofovir (TRUVADA) 200-300 MG per tablet  ibuprofen (ADVIL/MOTRIN) 800 MG tablet  medical cannabis (Patient's own supply)          Review of Systems   Constitutional: Negative.  Negative for chills and fever.   HENT: Negative.    Eyes: Negative.    Respiratory: Negative.  Negative for shortness of breath.    Cardiovascular: Negative.    Gastrointestinal: Negative.    Endocrine: Negative.    Genitourinary: Negative.    Musculoskeletal: Negative.    Allergic/Immunologic: Negative.    Neurological: Negative.    Hematological: Negative.    Psychiatric/Behavioral: Positive for suicidal ideas. Negative for self-injury. The patient is nervous/anxious.        Physical Exam   BP: 119/85  Pulse: 87  Temp: 96.4  F (35.8  C)  Resp: 20  SpO2: 99 %      Physical Exam  Constitutional:       General: She is not in acute distress.     Appearance: Normal appearance. She is normal weight. She is not toxic-appearing.   HENT:      Head: Normocephalic and atraumatic.      Mouth/Throat:      Mouth: Mucous membranes are dry.   Eyes:      Extraocular Movements: Extraocular movements intact.      Pupils:  Pupils are equal, round, and reactive to light.   Neck:      Musculoskeletal: Normal range of motion and neck supple.   Cardiovascular:      Rate and Rhythm: Normal rate and regular rhythm.   Pulmonary:      Effort: Pulmonary effort is normal.      Breath sounds: Normal breath sounds.   Abdominal:      General: Abdomen is flat.   Musculoskeletal: Normal range of motion.   Skin:     General: Skin is warm.   Neurological:      General: No focal deficit present.      Mental Status: She is alert.   Psychiatric:         Mood and Affect: Mood normal.         Behavior: Behavior normal.         ED Course     ED Course as of May 25 0243   Tue May 25, 2021   0226 Seen by diagnostic evaluation center.  Recommended patient be admitted for hospitalization.      0243 Case discussed with behavioral specialist.  Plan admit        Procedures        Results for orders placed or performed during the hospital encounter of 05/24/21 (from the past 24 hour(s))   HCG qualitative urine   Result Value Ref Range    HCG Qual Urine Negative NEG^Negative   GC/Chlamydia by PCR - [facility]   Result Value Ref Range    Specimen Source Midstream Urine     Neisseria gonorrhoreae PCR Not Detected NDET^Not Detected    Chlamydia Trachomatis PCR Not Detected NDET^Not Detected   Urine Drugs of Abuse Screen Panel 13   Result Value Ref Range    Cannabinoids (23-tfq-2-carboxy-9-THC) Detected, Abnormal Result (A) NDET^Not Detected ng/mL    Phencyclidine (Phencyclidine) Not Detected NDET^Not Detected ng/mL    Cocaine (Benzoylecgonine) Not Detected NDET^Not Detected ng/mL    Methamphetamine (d-Methamphetamine) Not Detected NDET^Not Detected ng/mL    Opiates (Morphine) Not Detected NDET^Not Detected ng/mL    Amphetamine (d-Amphetamine) Detected, Abnormal Result (A) NDET^Not Detected ng/mL    Benzodiazepines (Nordiazepam) Not Detected NDET^Not Detected ng/mL    Tricyclic Antidepressants (Desipramine) Not Detected NDET^Not Detected ng/mL    Methadone (Methadone) Not  Detected NDET^Not Detected ng/mL    Barbiturates (Butalbital) Not Detected NDET^Not Detected ng/mL    Oxycodone (Oxycodone) Not Detected NDET^Not Detected ng/mL    Propoxyphene (Norpropoxyphene) Not Detected NDET^Not Detected ng/mL    Buprenorphine (Buprenorphine) Detected, Abnormal Result (A) NDET^Not Detected ng/mL   Acetaminophen level   Result Value Ref Range    Acetaminophen Level <2 mg/L   Alcohol ethyl   Result Value Ref Range    Ethanol g/dL <0.01 0.01 g/dL   Basic metabolic panel   Result Value Ref Range    Sodium 139 133 - 144 mmol/L    Potassium 4.3 3.4 - 5.3 mmol/L    Chloride 106 94 - 109 mmol/L    Carbon Dioxide 26 20 - 32 mmol/L    Anion Gap 7 3 - 14 mmol/L    Glucose 96 70 - 99 mg/dL    Urea Nitrogen 12 7 - 30 mg/dL    Creatinine 0.74 0.52 - 1.04 mg/dL    GFR Estimate >90 >60 mL/min/[1.73_m2]    GFR Estimate If Black >90 >60 mL/min/[1.73_m2]    Calcium 9.0 8.5 - 10.1 mg/dL   CBC with platelets differential   Result Value Ref Range    WBC 8.4 4.0 - 11.0 10e9/L    RBC Count 4.91 3.8 - 5.2 10e12/L    Hemoglobin 14.9 11.7 - 15.7 g/dL    Hematocrit 44.6 35.0 - 47.0 %    MCV 91 78 - 100 fl    MCH 30.3 26.5 - 33.0 pg    MCHC 33.4 31.5 - 36.5 g/dL    RDW 11.9 10.0 - 15.0 %    Platelet Count 350 150 - 450 10e9/L    Diff Method Automated Method     % Neutrophils 61.6 %    % Lymphocytes 29.4 %    % Monocytes 5.5 %    % Eosinophils 2.3 %    % Basophils 0.8 %    % Immature Granulocytes 0.4 %    Nucleated RBCs 0 0 /100    Absolute Neutrophil 5.2 1.6 - 8.3 10e9/L    Absolute Lymphocytes 2.5 0.8 - 5.3 10e9/L    Absolute Monocytes 0.5 0.0 - 1.3 10e9/L    Absolute Eosinophils 0.2 0.0 - 0.7 10e9/L    Absolute Basophils 0.1 0.0 - 0.2 10e9/L    Abs Immature Granulocytes 0.0 0 - 0.4 10e9/L    Absolute Nucleated RBC 0.0    Salicylate level   Result Value Ref Range    Salicylate Level 2 mg/dL       Medications - No data to display    Assessments & Plan (with Medical Decision Making)     40-year-old female who presents today  with complaints of feeling like she is doing some control, anxious and tearful and bouts of suicidal ideation.  Patient states that symptoms began about 3 weeks ago when she stopped taking the medications.  Patient states he stopped taking her medications due to weight gain.  Patient wants to return to taking medication but does not have enough money for it.  Patient had no medical complaints.     In the emergency department patient had several outbursts tearful crying followed by calm periods.  Patient also paranoid about people looking at her. Expressed constant concerns about tge guard near her room. Patient had repetitive questioning. Physical exam showed no acute findings.  Labs as above.  Case discussed with diagnostic evaluation center who also agreed that patient would benefit from being restarted on medications and stabilized.  Patient is going to be admitted to behavioral health. Placed on 72 hour hold.     Due to the nature of this electronic medical record, laboratory results, imaging results, diagnosis, other information and medications reported above may not represent information available to me at the the time of my care and disposition. Medications reported above may have not been ordered by me.     Portions of the record may have been created with voice recognition software. Occasional wrong-word or 'sound-a- like' substitution may have occurred due to the inherent limitations of voice recognition software. Though the chart has been reviewed, there may be inadvertent transcription errors. Read the chart carefully and recognize, using context, where substitutions have occurred.       New Prescriptions    No medications on file       Final diagnoses:   Suicidal ideation   Bipolar affective disorder, remission status unspecified (H)       5/24/2021   [facility]     Eliza Murphy MD  05/27/21 1046
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
## 2021-05-25 NOTE — DISCHARGE SUMMARY
"Patient Name: Melba Cornelius   YOB: 1980  Age: 40 year old  [id number]    Primary Physician: No Ref-Primary, Physician   Completed by ROBBIE Argueta   none  ARHMS none  Primary psychiatrist/NP none  Therapist : therapist at [facility]  Family contact: none              CC: \" I never said I was suicidal. I flushed my meds because I didn't want the weight gain but I know that was a bad idea because they help my depression so much.\"     HPI   Melba Cornelius is a 40 year old  female who brings herself in for increasing dperessin. She has been off of her medications for three weeks and has been increasingly depressed. She states  . \" I never said I was suicidal. I flushed my meds because I didn't want the weight gain but I know that was a bad idea because they help my depression so much. I will have to put up with some weight gain.\" se states   Drug screen positive for bupenorphine, amphetamine, cannabis.   she is a bit tearful though states she is not suicidal and would never harm herself or anyone else. She has not had an attempt since . She hasnt been hospitalized since .   uds positive for meth 4/10/21.                Has periods increased goal directed, energy, little need for sleep, irritable, greater than 1 week  or hospitalization required. grandiose, flight of idea, distractibility, psychomotor agitation, activities with   painful outcomeperiods where there is a depressed mood where feels sad empty hopeless tearful, ahedonia, hypersomnia, psychomotor agitation and periods of psychomotor retardation. Periods where there is suicidality, worthlessness and excess guilt.               -  She states that abilify and celexa work very well and she would like to restart them as soon as possible. She feels they are immediately helpful. She has adderall and suboxone at home still.      Past Psychiatric History:      She has not been hospitalized since . Sh estates her mother  then " and she was going through a lot of grief and using heavily. She            Social History:      she has been living here for 4 months. Was born and raised in [place]. She left [place]  6months ago and was placed in a womens safe shelter in [place] though decided to move here to [place] because rent was cheaper in [place]. She lives with her two dogs in [place] in a duplex. She is single.           Chemical Use History: meth abuse, methadone misuse, thc, amphetamine abuse. Last treatment was in 2007.         Family Psychiatric History: none     Medical History and ROS             Prior to Admission medications    Medication Sig Start Date End Date Taking? Authorizing Provider   amphetamine-dextroamphetamine (ADDERALL XR) 20 MG 24 hr capsule Take 30 mg by mouth daily  1/3/19   Yes Reported, Patient   buprenorphine HCl-naloxone HCl (SUBOXONE) 2-0.5 MG per film Place 1 Film under the tongue daily     Yes Reported, Patient   acetaminophen (TYLENOL) 500 MG tablet Take 500 mg by mouth 8/15/19     Reported, Patient   amphetamine-dextroamphetamine (ADDERALL) 10 MG tablet Take 60 mg by mouth        Reported, Patient   ARIPiprazole (ABILIFY) 15 MG tablet         Reported, Patient   CITALOPRAM HYDROBROMIDE PO Take 20 mg by mouth       Reported, Patient   emtricitabine-tenofovir (TRUVADA) 200-300 MG per tablet Take 1 tablet by mouth 6/18/20     Reported, Patient   ibuprofen (ADVIL/MOTRIN) 800 MG tablet Take 1 tablet (800 mg) by mouth every 8 hours as needed for moderate pain  Patient not taking: Reported on 4/1/2021 2/23/21     Lynn Hernandes CNP   medical cannabis (Patient's own supply) 1 Dose See Admin Instructions (The purpose of this order is to document that the patient reports taking medical cannabis.  This is not a prescription, and is not used to certify that the patient has a qualifying medical condition.)       Reported, Patient            Allergies   Allergen Reactions     Droperidol Shortness Of Breath      Haldol Anxiety, Difficulty breathing, Muscle Pain (Myalgia), Palpitations, Photosensitivity, Shortness Of Breath and Visual Disturbance     Haloperidol Shortness Of Breath       Other reaction(s): Tetany     Ketorolac Shortness Of Breath     Olanzapine Shortness Of Breath       Other reaction(s): Other (see comments)     Promethazine Other (See Comments) and Shortness Of Breath       anxiety     Bupropion Other (See Comments) and Unknown       Side effect  Side effect.  Became very suicidal and physically ill.  Suicidal thoughts     Inapsine [Butyrophenones]       No Clinical Screening - See Comments         See Scan 11/00 For Multiple Intolerances     Phenothiazine       Wellbutrin [Bupropion Hcl]       Metoclopramide Anxiety      Past Medical History        Past Medical History:   Diagnosis Date     Acne       Major depressive disorder       Migraine headache       Polysubstance abuse (H) 2011     meth, hx of commitment     Smoker       Suicide attempt (H) 2007     via drug overdose         Past Surgical History         Past Surgical History:   Procedure Laterality Date     BACK SURGERY         C LT X-RAY HEEL         surgery     HC REMOVE TONSILS/ADENOIDS,14+ Y/O         T & A 14+y.o.     HC TOOTH EXTRACTION W/FORCEP                Current Medications   Prescriptions Prior to Admission   Medications Prior to Admission   Medication Sig Dispense Refill Last Dose     amphetamine-dextroamphetamine (ADDERALL XR) 30 MG 24 hr capsule Take 30 mg by mouth daily    0 5/24/2021 at Unknown time     buprenorphine HCl-naloxone HCl (SUBOXONE) 2-0.5 MG per film Place 1 Film under the tongue daily     5/24/2021 at Unknown time     ARIPiprazole (ABILIFY) 15 MG tablet Take 15 mg by mouth daily            citalopram (CELEXA) 20 MG tablet Take 20 mg by mouth daily            ibuprofen (ADVIL/MOTRIN) 800 MG tablet Take 1 tablet (800 mg) by mouth every 8 hours as needed for moderate pain (Patient not taking: Reported on 4/1/2021)  "24 tablet 0       medical cannabis (Patient's own supply) 1 Dose See Admin Instructions (The purpose of this order is to document that the patient reports taking medical cannabis.  This is not a prescription, and is not used to certify that the patient has a qualifying medical condition.)                     Past Psychiatric Medications Tried      abilify   celexa             MSE/PSYCH  PSYCHIATRIC EXAM  /86   Pulse 95   Temp 99.6  F (37.6  C) (Temporal)   Resp 16   Ht 1.651 m (5' 5\")   Wt 82.9 kg (182 lb 11.2 oz)   SpO2 97%   BMI 30.40 kg/m    MSE/PSYCH  PSYCHIATRIC EXAM  /86   Pulse 95   Temp 99.6  F (37.6  C) (Temporal)   Resp 16   Ht 1.651 m (5' 5\")   Wt 82.9 kg (182 lb 11.2 oz)   SpO2 97%   BMI 30.40 kg/m    -Appearance/Behavior: slightly disheveled.   -Motor: intact  -Gait: intact  -Abnormal involuntary movements: none  -Mood: tearful at times.   -Affect: reactive though sad at times.   -Speech: regular rate and tone  -Thought process/associations: Logical and Goal directed.  -Thought content: no delusions or hallucinations  -Perceptual disturbances: No hallucinations..              -Suicidal/Homicidal Ideation: no suicidal thoughts  -Judgment: fair  -Insight: intact  *Orientation: time, place and person.  *Memory: intact  *Attention: fair  *Language: fluent, no aphasias, able to repeat phrases and name objects. Vocab intact.  *Fund of information: appropriate for education  *Cognitive functioning estimate: 0 - independent.           Labs:          Results for orders placed or performed during the hospital encounter of 05/24/21   HCG qualitative urine     Status: None   Result Value Ref Range     HCG Qual Urine Negative NEG^Negative   Acetaminophen level     Status: None   Result Value Ref Range     Acetaminophen Level <2 mg/L   Alcohol ethyl     Status: None   Result Value Ref Range     Ethanol g/dL <0.01 0.01 g/dL   Basic metabolic panel     Status: None   Result Value Ref Range     " Sodium 139 133 - 144 mmol/L     Potassium 4.3 3.4 - 5.3 mmol/L     Chloride 106 94 - 109 mmol/L     Carbon Dioxide 26 20 - 32 mmol/L     Anion Gap 7 3 - 14 mmol/L     Glucose 96 70 - 99 mg/dL     Urea Nitrogen 12 7 - 30 mg/dL     Creatinine 0.74 0.52 - 1.04 mg/dL     GFR Estimate >90 >60 mL/min/[1.73_m2]     GFR Estimate If Black >90 >60 mL/min/[1.73_m2]     Calcium 9.0 8.5 - 10.1 mg/dL   CBC with platelets differential     Status: None   Result Value Ref Range     WBC 8.4 4.0 - 11.0 10e9/L     RBC Count 4.91 3.8 - 5.2 10e12/L     Hemoglobin 14.9 11.7 - 15.7 g/dL     Hematocrit 44.6 35.0 - 47.0 %     MCV 91 78 - 100 fl     MCH 30.3 26.5 - 33.0 pg     MCHC 33.4 31.5 - 36.5 g/dL     RDW 11.9 10.0 - 15.0 %     Platelet Count 350 150 - 450 10e9/L     Diff Method Automated Method       % Neutrophils 61.6 %     % Lymphocytes 29.4 %     % Monocytes 5.5 %     % Eosinophils 2.3 %     % Basophils 0.8 %     % Immature Granulocytes 0.4 %     Nucleated RBCs 0 0 /100     Absolute Neutrophil 5.2 1.6 - 8.3 10e9/L     Absolute Lymphocytes 2.5 0.8 - 5.3 10e9/L     Absolute Monocytes 0.5 0.0 - 1.3 10e9/L     Absolute Eosinophils 0.2 0.0 - 0.7 10e9/L     Absolute Basophils 0.1 0.0 - 0.2 10e9/L     Abs Immature Granulocytes 0.0 0 - 0.4 10e9/L     Absolute Nucleated RBC 0.0     Salicylate level     Status: None   Result Value Ref Range     Salicylate Level 2 mg/dL   Urine Drugs of Abuse Screen Panel 13     Status: Abnormal   Result Value Ref Range     Cannabinoids (38-djo-7-carboxy-9-THC) Detected, Abnormal Result (A) NDET^Not Detected ng/mL     Phencyclidine (Phencyclidine) Not Detected NDET^Not Detected ng/mL     Cocaine (Benzoylecgonine) Not Detected NDET^Not Detected ng/mL     Methamphetamine (d-Methamphetamine) Not Detected NDET^Not Detected ng/mL     Opiates (Morphine) Not Detected NDET^Not Detected ng/mL     Amphetamine (d-Amphetamine) Detected, Abnormal Result (A) NDET^Not Detected ng/mL     Benzodiazepines (Nordiazepam) Not  "Detected NDET^Not Detected ng/mL     Tricyclic Antidepressants (Desipramine) Not Detected NDET^Not Detected ng/mL     Methadone (Methadone) Not Detected NDET^Not Detected ng/mL     Barbiturates (Butalbital) Not Detected NDET^Not Detected ng/mL     Oxycodone (Oxycodone) Not Detected NDET^Not Detected ng/mL     Propoxyphene (Norpropoxyphene) Not Detected NDET^Not Detected ng/mL     Buprenorphine (Buprenorphine) Detected, Abnormal Result (A) NDET^Not Detected ng/mL   Asymptomatic SARS-CoV-2 COVID-19 Virus (Coronavirus) by PCR     Status: None     Specimen: Nasopharyngeal   Result Value Ref Range     SARS-CoV-2 Virus Specimen Source Nasopharyngeal       SARS-CoV-2 PCR Result NEGATIVE       SARS-CoV-2 PCR Comment           Testing was performed using the Xpert Xpress SARS-CoV-2 Assay on the Cepheid Gene-Xpert   Instrument Systems. Additional information about this Emergency Use Authorization (EUA)   assay can be found via the Lab Guide.      GC/Chlamydia by PCR - [facility]     Status: None   Result Value Ref Range     Specimen Source Midstream Urine       Neisseria gonorrhoreae PCR Not Detected NDET^Not Detected     Chlamydia Trachomatis PCR Not Detected NDET^Not Detected      tsh 1.57 2 months ago, will not repeat        Hep C positive           Assessment/Impression: This is a 40 year old yo female with increased depression after flushing her celexa and abilfy due to \"fear of weight gain\". She states \"I only came here to get back on my medications and all I need are those meds. She states she feels the weight gain is an issue though is less of a risk than her depression becomes. We discussed starting metformin for weight gain associated with abifliy and she is more than happy to try this. I assume her outpatient providers will not have a problem continuing this with proper lab monitoring.   Educated regarding medication indications, risks, benefits, side effects, contraindications and possible interactions. Verbally " expressed understanding.               DX:      Bipolar type 1   methamphetamine use disorder severe  Opiates use disorder in controlled remission with suboxone.            Plan:   Med Changes:     Restart abilify  Restart celexa  Start metformin  meds sent to [facility].     She has the rest of her medications at home.         DC Planning:     She Is retuning home to her duplex In [place] today  She has been working with [facility] in [facility] for 4 years with therapy and med management. Her appointments are telemed so she is easily able to complete them.

## 2021-05-25 NOTE — H&P
"Psychiatric Eval/H&P    Patient Name: Melba Cornelius   YOB: 1980  Age: 40 year old  [id number]    Primary Physician: No Ref-Primary, Physician   Completed by ROBBIE Argueta   none  ARHMS none  Primary psychiatrist/NP none  Therapist : therapist at [facility]  Family contact: none          CC: \" I never said I was suicidal. I flushed my meds because I didn't want the weight gain but I know that was a bad idea because they help my depression so much.\"    HPI   Melba Cornelius is a 40 year old  female who brings herself in for increasing dperessin. She has been off of her medications for three weeks and has been increasingly depressed. She states  . \" I never said I was suicidal. I flushed my meds because I didn't want the weight gain but I know that was a bad idea because they help my depression so much. I will have to put up with some weight gain.\" [facility] states   Drug screen positive for bupenorphine, amphetamine, cannabis.   she is a bit tearful though states she is not suicidal and would never harm herself or anyone else. She has not had an attempt since 2007. She hasnt been hospitalized since 2007.   uds positive for meth 4/10/21.            Has periods increased goal directed, energy, little need for sleep, irritable, greater than 1 week  or hospitalization required. grandiose, flight of idea, distractibility, psychomotor agitation, activities with   painful outcomeperiods where there is a depressed mood where feels sad empty hopeless tearful, ahedonia, hypersomnia, psychomotor agitation and periods of psychomotor retardation. Periods where there is suicidality, worthlessness and excess guilt.               -  She states that abilify and celexa work very well and she would like to restart them as soon as possible. She feels they are immediately helpful. She has adderall and suboxone at home still.      Past Psychiatric History:     She has not been hospitalized since 2007.  estates her " mother  then and she was going through a lot of grief and using heavily. She          Social History:     she has been living here for 4 months. Was born and raised in [place]. She left [place]  6months ago and was placed in a womens safe shelter in [place] though decided to move here to [place] because rent was cheaper in [place]. She lives with her two dogs in [place] in a duplex. She is single.        Chemical Use History: meth abuse, methadone misuse, thc, amphetamine abuse. Last treatment was in .        Family Psychiatric History: none     Medical History and ROS    Prior to Admission medications    Medication Sig Start Date End Date Taking? Authorizing Provider   amphetamine-dextroamphetamine (ADDERALL XR) 20 MG 24 hr capsule Take 30 mg by mouth daily  1/3/19  Yes Reported, Patient   buprenorphine HCl-naloxone HCl (SUBOXONE) 2-0.5 MG per film Place 1 Film under the tongue daily   Yes Reported, Patient   acetaminophen (TYLENOL) 500 MG tablet Take 500 mg by mouth 8/15/19   Reported, Patient   amphetamine-dextroamphetamine (ADDERALL) 10 MG tablet Take 60 mg by mouth     Reported, Patient   ARIPiprazole (ABILIFY) 15 MG tablet     Reported, Patient   CITALOPRAM HYDROBROMIDE PO Take 20 mg by mouth    Reported, Patient   emtricitabine-tenofovir (TRUVADA) 200-300 MG per tablet Take 1 tablet by mouth 20   Reported, Patient   ibuprofen (ADVIL/MOTRIN) 800 MG tablet Take 1 tablet (800 mg) by mouth every 8 hours as needed for moderate pain  Patient not taking: Reported on 2021   Lynn Hernandes, [facility]   medical cannabis (Patient's own supply) 1 Dose See Admin Instructions (The purpose of this order is to document that the patient reports taking medical cannabis.  This is not a prescription, and is not used to certify that the patient has a qualifying medical condition.)    Reported, Patient     Allergies   Allergen Reactions     Droperidol Shortness Of Breath     Haldol Anxiety,  Difficulty breathing, Muscle Pain (Myalgia), Palpitations, Photosensitivity, Shortness Of Breath and Visual Disturbance     Haloperidol Shortness Of Breath     Other reaction(s): Tetany     Ketorolac Shortness Of Breath     Olanzapine Shortness Of Breath     Other reaction(s): Other (see comments)     Promethazine Other (See Comments) and Shortness Of Breath     anxiety     Bupropion Other (See Comments) and Unknown     Side effect  Side effect.  Became very suicidal and physically ill.  Suicidal thoughts     Inapsine [Butyrophenones]      No Clinical Screening - See Comments      See Scan 11/00 For Multiple Intolerances     Phenothiazine      Wellbutrin [Bupropion Hcl]      Metoclopramide Anxiety     Past Medical History:   Diagnosis Date     Acne      Major depressive disorder      Migraine headache      Polysubstance abuse (H) 2011    meth, hx of commitment     Smoker      Suicide attempt (H) 2007    via drug overdose     Past Surgical History:   Procedure Laterality Date     BACK SURGERY       C LT X-RAY HEEL      surgery     HC REMOVE TONSILS/ADENOIDS,14+ Y/O      T & A 14+y.o.     HC TOOTH EXTRACTION W/FORCEP         Current Medications   Medications Prior to Admission   Medication Sig Dispense Refill Last Dose     amphetamine-dextroamphetamine (ADDERALL XR) 30 MG 24 hr capsule Take 30 mg by mouth daily   0 5/24/2021 at Unknown time     buprenorphine HCl-naloxone HCl (SUBOXONE) 2-0.5 MG per film Place 1 Film under the tongue daily   5/24/2021 at Unknown time     ARIPiprazole (ABILIFY) 15 MG tablet Take 15 mg by mouth daily         citalopram (CELEXA) 20 MG tablet Take 20 mg by mouth daily         ibuprofen (ADVIL/MOTRIN) 800 MG tablet Take 1 tablet (800 mg) by mouth every 8 hours as needed for moderate pain (Patient not taking: Reported on 4/1/2021) 24 tablet 0      medical cannabis (Patient's own supply) 1 Dose See Admin Instructions (The purpose of this order is to document that the patient reports taking  "medical cannabis.  This is not a prescription, and is not used to certify that the patient has a qualifying medical condition.)            Past Psychiatric Medications Tried     abilify   celexa      Physical Exam/ROS:     Please refer to the physical exam and review of systems completed by Dr jarrett on 5/24/21 . No Further issues of concern noted           MSE/PSYCH  PSYCHIATRIC EXAM  /86   Pulse 95   Temp 99.6  F (37.6  C) (Temporal)   Resp 16   Ht 1.651 m (5' 5\")   Wt 82.9 kg (182 lb 11.2 oz)   SpO2 97%   BMI 30.40 kg/m    MSE/PSYCH  PSYCHIATRIC EXAM  /86   Pulse 95   Temp 99.6  F (37.6  C) (Temporal)   Resp 16   Ht 1.651 m (5' 5\")   Wt 82.9 kg (182 lb 11.2 oz)   SpO2 97%   BMI 30.40 kg/m    -Appearance/Behavior: slightly disheveled.   -Motor: intact  -Gait: intact  -Abnormal involuntary movements: none  -Mood: tearful at times.   -Affect: reactive though sad at times.   -Speech: regular rate and tone  -Thought process/associations: Logical and Goal directed.  -Thought content: no delusions or hallucinations  -Perceptual disturbances: No hallucinations..              -Suicidal/Homicidal Ideation: no suicidal thoughts  -Judgment: fair  -Insight: intact  *Orientation: time, place and person.  *Memory: intact  *Attention: fair  *Language: fluent, no aphasias, able to repeat phrases and name objects. Vocab intact.  *Fund of information: appropriate for education  *Cognitive functioning estimate: 0 - independent.         Labs:   Results for orders placed or performed during the hospital encounter of 05/24/21   HCG qualitative urine     Status: None   Result Value Ref Range    HCG Qual Urine Negative NEG^Negative   Acetaminophen level     Status: None   Result Value Ref Range    Acetaminophen Level <2 mg/L   Alcohol ethyl     Status: None   Result Value Ref Range    Ethanol g/dL <0.01 0.01 g/dL   Basic metabolic panel     Status: None   Result Value Ref Range    Sodium 139 133 - 144 mmol/L    " Potassium 4.3 3.4 - 5.3 mmol/L    Chloride 106 94 - 109 mmol/L    Carbon Dioxide 26 20 - 32 mmol/L    Anion Gap 7 3 - 14 mmol/L    Glucose 96 70 - 99 mg/dL    Urea Nitrogen 12 7 - 30 mg/dL    Creatinine 0.74 0.52 - 1.04 mg/dL    GFR Estimate >90 >60 mL/min/[1.73_m2]    GFR Estimate If Black >90 >60 mL/min/[1.73_m2]    Calcium 9.0 8.5 - 10.1 mg/dL   CBC with platelets differential     Status: None   Result Value Ref Range    WBC 8.4 4.0 - 11.0 10e9/L    RBC Count 4.91 3.8 - 5.2 10e12/L    Hemoglobin 14.9 11.7 - 15.7 g/dL    Hematocrit 44.6 35.0 - 47.0 %    MCV 91 78 - 100 fl    MCH 30.3 26.5 - 33.0 pg    MCHC 33.4 31.5 - 36.5 g/dL    RDW 11.9 10.0 - 15.0 %    Platelet Count 350 150 - 450 10e9/L    Diff Method Automated Method     % Neutrophils 61.6 %    % Lymphocytes 29.4 %    % Monocytes 5.5 %    % Eosinophils 2.3 %    % Basophils 0.8 %    % Immature Granulocytes 0.4 %    Nucleated RBCs 0 0 /100    Absolute Neutrophil 5.2 1.6 - 8.3 10e9/L    Absolute Lymphocytes 2.5 0.8 - 5.3 10e9/L    Absolute Monocytes 0.5 0.0 - 1.3 10e9/L    Absolute Eosinophils 0.2 0.0 - 0.7 10e9/L    Absolute Basophils 0.1 0.0 - 0.2 10e9/L    Abs Immature Granulocytes 0.0 0 - 0.4 10e9/L    Absolute Nucleated RBC 0.0    Salicylate level     Status: None   Result Value Ref Range    Salicylate Level 2 mg/dL   Urine Drugs of Abuse Screen Panel 13     Status: Abnormal   Result Value Ref Range    Cannabinoids (45-qmq-0-carboxy-9-THC) Detected, Abnormal Result (A) NDET^Not Detected ng/mL    Phencyclidine (Phencyclidine) Not Detected NDET^Not Detected ng/mL    Cocaine (Benzoylecgonine) Not Detected NDET^Not Detected ng/mL    Methamphetamine (d-Methamphetamine) Not Detected NDET^Not Detected ng/mL    Opiates (Morphine) Not Detected NDET^Not Detected ng/mL    Amphetamine (d-Amphetamine) Detected, Abnormal Result (A) NDET^Not Detected ng/mL    Benzodiazepines (Nordiazepam) Not Detected NDET^Not Detected ng/mL    Tricyclic Antidepressants (Desipramine) Not  "Detected NDET^Not Detected ng/mL    Methadone (Methadone) Not Detected NDET^Not Detected ng/mL    Barbiturates (Butalbital) Not Detected NDET^Not Detected ng/mL    Oxycodone (Oxycodone) Not Detected NDET^Not Detected ng/mL    Propoxyphene (Norpropoxyphene) Not Detected NDET^Not Detected ng/mL    Buprenorphine (Buprenorphine) Detected, Abnormal Result (A) NDET^Not Detected ng/mL   Asymptomatic SARS-CoV-2 COVID-19 Virus (Coronavirus) by PCR     Status: None    Specimen: Nasopharyngeal   Result Value Ref Range    SARS-CoV-2 Virus Specimen Source Nasopharyngeal     SARS-CoV-2 PCR Result NEGATIVE     SARS-CoV-2 PCR Comment       Testing was performed using the Xpert Xpress SARS-CoV-2 Assay on the Cepheid Gene-Xpert   Instrument Systems. Additional information about this Emergency Use Authorization (EUA)   assay can be found via the Lab Guide.     GC/Chlamydia by PCR - [facility]     Status: None   Result Value Ref Range    Specimen Source Midstream Urine     Neisseria gonorrhoreae PCR Not Detected NDET^Not Detected    Chlamydia Trachomatis PCR Not Detected NDET^Not Detected     tsh 1.57 2 months ago, will not repeat       Hep C positive        Assessment/Impression: This is a 40 year old yo female with increased depression after flushing her celexa and abilfy due to \"fear of weight gain\". She states \"I only came here to get back on my medications and all I need are those meds. She states she feels the weight gain is an issue though is less of a risk than her depression becomes. We discussed starting metformin for weight gain associated with abifliy and she is more than happy to try this. I assume her outpatient providers will not have a problem continuing this with proper lab monitoring.   Educated regarding medication indications, risks, benefits, side effects, contraindications and possible interactions. Verbally expressed understanding.           DX:      Bipolar type 1   methamphetamine use disorder severe  Opiates use " disorder in controlled remission with suboxone.         Plan:   Med Changes:    Restart abilify  Restart celexa  Start metformin  meds sent to [facility].    She has the rest of her medications at home.       DC Planning:    She Is retuning home to her duplex In [place] today  She has [facility] with [facility] for 4 years with therapy and med management. Her appointments are telemed so she is easily able to complete them.

## 2021-05-25 NOTE — PLAN OF CARE
Problem: Behavioral Health Plan of Care  Goal: Patient-Specific Goal (Individualization)  Description: Pt will attend at least 50% of group.  Pt will slept at least 6-8 hours per night.  Pt will be compliant with ordered medications and treatment team recommendations.    Outcome: Improving  Note:        Problem: Suicidal Behavior  Goal: Suicidal Behavior is Absent or Managed  Description: Pt will be free from self harm during hospital stay.  Pt will verbalize decrease suicide Ideation by discharge.  Pt will verbalize suicide prevention plan by discharge.   Outcome: Improving     Problem: Fall Injury Risk  Goal: Absence of Fall and Fall-Related Injury  Outcome: Improving

## 2021-05-25 NOTE — ED NOTES
Called [employer] regarding patients dogs. PD recommended that we contact an animal shelter regarding this to inquire about sheltering while patient is admitted.

## 2022-04-01 ENCOUNTER — TRANSFERRED RECORDS (OUTPATIENT)
Dept: MULTI SPECIALTY CLINIC | Facility: CLINIC | Age: 42
End: 2022-04-01

## 2022-04-01 LAB — PAP SMEAR - HIM PATIENT REPORTED: NEGATIVE

## 2022-10-24 NOTE — PROGRESS NOTES
[facility] Initial Evaluation  Subjective:  Patient is a 38 year old female presenting with rehab left ankle/foot hpi. The history is provided by the patient. No  was used.   Melba Cornelius is a 38 year old female with a left ankle and left foot condition.  Condition occurred with:  A wrong landing.  Condition occurred: in the community.  This is a chronic condition  In 2016 she jumped into a 50 foot deep hole and fractured her L ankle as well as 3 vertebrae. She had surgery on L ankle placing 16 pins and 2 plates. She did have therapy. She then had Great toe surgery in 2017 for bone spur and again had PT. She has not been seen in PT or by [facility] in 11 months. She shows up today hoping to try [facility] for ankle and foot. She was sent to [facility] next door 10/29/18 for evaluation before we start treatment.    Patient reports pain:  Anterior, great toe, joint and lateral.  Radiates to:  Ankle and foot.  Pain is described as aching and sharp and is intermittent   Associated symptoms:  Tingling, buckling/giving out, edema, loss of motion/stiffness, loss of strength and numbness. Pain is worse during the day.  Symptoms are exacerbated by activity, ascending stairs, bending/squatting, standing, descending stairs, walking and weight bearing and relieved by rest.  Since onset symptoms are gradually worsening.  Special tests:  X-ray.  Previous treatment includes physical therapy and surgery.  There was mild improvement following previous treatment.  General health as reported by patient is fair.  Pertinent medical history includes:  Depression, history of fractures, numbness/tingling, osteoarthritis, overweight, mental illness and concussions/dizziness.  Medical allergies: yes.  Other surgeries include:  Orthopedic surgery.  Current medications:  None as reported by patient.  Current occupation is Not working.            Red flags:  Pain at night/rest and significant weakness.                         Objective:    Gait:    Gait Type:  Antalgic   Assistive Devices:  None  Deviations:  Ankle:  Heel strike decr L and push off decr LGeneral Deviations:  Toe out L and stride length decr          Ankle/Foot Evaluation  ROM:    AROM:    Dorsiflexion:  Left:   7  Right:   12  Plantarflexion:  Left:  45    Right:  65  Inversion:  Left:  30     Right:  50  Eversion:  0     Right:  12    Great Toe Extension:  Left:  0     Right: 45  PROM:              Great Toe Extension:  Left:    0     Right: 65      Strength:    Dorsiflexion:  Left: 4/5     Pain:   Right: 5/5   Pain:  Plantarflexion: Left: 3+/5    Pain:+++     Inversion:Left: 4-/5  Pain:       Eversion:Left: 4-/5  Pain:                    LIGAMENT TESTING: not assessed              SPECIAL TESTS: not assessed      EDEMA:   Left ankle edema present at: forefoot          MOBILITY TESTING:       Talocrural Left: hypomobile      Subtalar Left: hypomobile      Midtarsal Left: hypomobile      First Ray Left: hypomobile      FUNCTIONAL TESTS: not assessed                                                              General     ROS    Assessment/Plan:    Patient is a 38 year old female with left side ankle complaints.    Patient has the following significant findings with corresponding treatment plan.                Diagnosis 1:  L ankle fracture with ORIF along with s/p Great toe surgery L  Pain -  manual therapy, self management, education and home program  Decreased ROM/flexibility - manual therapy and therapeutic exercise  Decreased joint mobility - manual therapy and therapeutic exercise  Decreased strength - therapeutic exercise and therapeutic activities  Impaired balance - neuro re-education and therapeutic activities  Decreased proprioception - neuro re-education and therapeutic activities  Inflammation - self management/home program  Edema - self management/home program  Impaired gait - gait training  Impaired muscle performance - neuro re-education  Decreased  function - therapeutic activities    Therapy Evaluation Codes:   1) History comprised of:   Personal factors that impact the plan of care:      Anxiety, Coping style, Overall behavior pattern, Past/current experiences, Social history/culture and Time since onset of symptoms.    Comorbidity factors that impact the plan of care are:      Concussion, Depression, Mental illness and Overweight.     Medications impacting care: None.  2) Examination of Body Systems comprised of:   Body structures and functions that impact the plan of care:      Ankle.   Activity limitations that impact the plan of care are:      Jumping, Running, Squatting/kneeling, Stairs, Standing and Walking.  3) Clinical presentation characteristics are:   Unstable/Unpredictable.  4) Decision-Making    High complexity using standardized patient assessment instrument and/or measureable assessment of functional outcome.  Cumulative Therapy Evaluation is: High complexity.    Previous and current functional limitations:  (See Goal Flow Sheet for this information)    Short term and Long term goals: (See Goal Flow Sheet for this information)     Communication ability:  Patient appears to be able to clearly communicate and understand verbal and written communication and follow directions correctly.  Treatment Explanation - The following has been discussed with the patient:   RX ordered/plan of care  Anticipated outcomes  Possible risks and side effects  This patient would benefit from PT intervention to resume normal activities.   Rehab potential is good.    Frequency:  1 X week, once daily  Duration:  for 12 weeks  Discharge Plan:  Achieve all LTG.  Independent in home treatment program.  Reach maximal therapeutic benefit.    Please refer to the daily flowsheet for treatment today, total treatment time and time spent performing 1:1 timed codes.        Yes

## 2022-10-27 ENCOUNTER — TRANSFERRED RECORDS (OUTPATIENT)
Dept: HEALTH INFORMATION MANAGEMENT | Facility: CLINIC | Age: 42
End: 2022-10-27

## 2022-11-17 ENCOUNTER — TRANSFERRED RECORDS (OUTPATIENT)
Dept: HEALTH INFORMATION MANAGEMENT | Facility: CLINIC | Age: 42
End: 2022-11-17

## 2023-02-23 ENCOUNTER — TRANSFERRED RECORDS (OUTPATIENT)
Dept: HEALTH INFORMATION MANAGEMENT | Facility: CLINIC | Age: 43
End: 2023-02-23

## 2023-06-02 ENCOUNTER — OFFICE VISIT (OUTPATIENT)
Dept: FAMILY MEDICINE | Facility: CLINIC | Age: 43
End: 2023-06-02
Payer: MEDICARE

## 2023-06-02 VITALS
DIASTOLIC BLOOD PRESSURE: 82 MMHG | SYSTOLIC BLOOD PRESSURE: 116 MMHG | RESPIRATION RATE: 18 BRPM | WEIGHT: 196.3 LBS | OXYGEN SATURATION: 98 % | HEART RATE: 103 BPM | BODY MASS INDEX: 32.71 KG/M2 | HEIGHT: 65 IN

## 2023-06-02 DIAGNOSIS — Z79.891 CHRONIC PRESCRIPTION OPIATE USE: ICD-10-CM

## 2023-06-02 DIAGNOSIS — F33.9 EPISODE OF RECURRENT MAJOR DEPRESSIVE DISORDER, UNSPECIFIED DEPRESSION EPISODE SEVERITY (H): ICD-10-CM

## 2023-06-02 DIAGNOSIS — Z76.89 ENCOUNTER TO ESTABLISH CARE: Primary | ICD-10-CM

## 2023-06-02 DIAGNOSIS — Z86.19 HISTORY OF HEPATITIS C: ICD-10-CM

## 2023-06-02 DIAGNOSIS — F31.9 BIPOLAR I DISORDER (H): ICD-10-CM

## 2023-06-02 DIAGNOSIS — F90.9 ATTENTION DEFICIT HYPERACTIVITY DISORDER (ADHD), UNSPECIFIED ADHD TYPE: ICD-10-CM

## 2023-06-02 PROCEDURE — 99214 OFFICE O/P EST MOD 30 MIN: CPT | Performed by: STUDENT IN AN ORGANIZED HEALTH CARE EDUCATION/TRAINING PROGRAM

## 2023-06-02 RX ORDER — ARIPIPRAZOLE 20 MG/1
20 TABLET ORAL DAILY
Qty: 30 TABLET | Refills: 1 | Status: SHIPPED | OUTPATIENT
Start: 2023-06-02 | End: 2023-07-31

## 2023-06-02 RX ORDER — DEXTROAMPHETAMINE SACCHARATE, AMPHETAMINE ASPARTATE MONOHYDRATE, DEXTROAMPHETAMINE SULFATE AND AMPHETAMINE SULFATE 7.5; 7.5; 7.5; 7.5 MG/1; MG/1; MG/1; MG/1
30 CAPSULE, EXTENDED RELEASE ORAL DAILY PRN
Refills: 0 | Status: CANCELLED | OUTPATIENT
Start: 2023-06-02

## 2023-06-02 RX ORDER — CITALOPRAM HYDROBROMIDE 20 MG/1
20 TABLET ORAL DAILY
Qty: 90 TABLET | Refills: 0 | Status: SHIPPED | OUTPATIENT
Start: 2023-06-02

## 2023-06-02 RX ORDER — DEXTROAMPHETAMINE SACCHARATE, AMPHETAMINE ASPARTATE MONOHYDRATE, DEXTROAMPHETAMINE SULFATE AND AMPHETAMINE SULFATE 3.75; 3.75; 3.75; 3.75 MG/1; MG/1; MG/1; MG/1
15 CAPSULE, EXTENDED RELEASE ORAL DAILY
Qty: 30 CAPSULE | Refills: 0 | Status: SHIPPED | OUTPATIENT
Start: 2023-06-02 | End: 2023-07-02

## 2023-06-02 RX ORDER — DEXTROAMPHETAMINE SACCHARATE, AMPHETAMINE ASPARTATE MONOHYDRATE, DEXTROAMPHETAMINE SULFATE AND AMPHETAMINE SULFATE 3.75; 3.75; 3.75; 3.75 MG/1; MG/1; MG/1; MG/1
15 CAPSULE, EXTENDED RELEASE ORAL DAILY
Qty: 30 CAPSULE | Refills: 0 | Status: SHIPPED | OUTPATIENT
Start: 2023-07-03 | End: 2023-07-06

## 2023-06-02 RX ORDER — BUPRENORPHINE AND NALOXONE 8; 2 MG/1; MG/1
1 FILM, SOLUBLE BUCCAL; SUBLINGUAL 2 TIMES DAILY
Qty: 60 EACH | Refills: 1 | Status: SHIPPED | OUTPATIENT
Start: 2023-06-02

## 2023-06-02 ASSESSMENT — ANXIETY QUESTIONNAIRES
5. BEING SO RESTLESS THAT IT IS HARD TO SIT STILL: NOT AT ALL
GAD7 TOTAL SCORE: 0
7. FEELING AFRAID AS IF SOMETHING AWFUL MIGHT HAPPEN: NOT AT ALL
1. FEELING NERVOUS, ANXIOUS, OR ON EDGE: NOT AT ALL
6. BECOMING EASILY ANNOYED OR IRRITABLE: NOT AT ALL
2. NOT BEING ABLE TO STOP OR CONTROL WORRYING: NOT AT ALL
3. WORRYING TOO MUCH ABOUT DIFFERENT THINGS: NOT AT ALL
IF YOU CHECKED OFF ANY PROBLEMS ON THIS QUESTIONNAIRE, HOW DIFFICULT HAVE THESE PROBLEMS MADE IT FOR YOU TO DO YOUR WORK, TAKE CARE OF THINGS AT HOME, OR GET ALONG WITH OTHER PEOPLE: NOT DIFFICULT AT ALL
GAD7 TOTAL SCORE: 0

## 2023-06-02 ASSESSMENT — PATIENT HEALTH QUESTIONNAIRE - PHQ9
5. POOR APPETITE OR OVEREATING: NOT AT ALL
SUM OF ALL RESPONSES TO PHQ QUESTIONS 1-9: 0

## 2023-06-02 NOTE — PROGRESS NOTES
Consult Note:    Reason for consult and summary: Dr. Haddad requested behavioral health consultation for this patient regarding needed mental health services. Melba is a 42 year old female that agreed to be seen by behavioral health today. Melba presents as alert and oriented, engaged in our discussion.    Topics:  Melba reports that she has been abstinent from substances for 7 months, one relapse in April.  Was seeing Dr. Jackie Baldwin at [facility] for psychiatry.  States he was upset with her for going to [facility] for a period of time and letting him know, so he asked her to find another psychiatrist.  She sees a therapist, Danielle, at [facility] which is the residential treatment program she recently completed.  She attends NA meetings one time a week. Does not have a sponsor yet, but is looking for someone.  Feels like outpatient therapy with her therapist is adequate now as aftercare plan.  Not interested in an outpatient program.  Has a CADI waiver and they are working with her on housing.    Recommendations and Plan:    Melba will schedule an appt with psychiatry when central scheduling calls, order placed    Melba is aware she can reach out if she decides there are other services she would like or needs for mental health and substance use treatment    The results and recommendations of this consult were reviewed with Dr. Catie Augustin PsyD, LP

## 2023-06-02 NOTE — PROGRESS NOTES
Assessment & Plan     Melba was seen today for new patient, referral and medication therapy management.    Diagnoses and all orders for this visit:    Encounter to establish care    Attention deficit hyperactivity disorder (ADHD), unspecified ADHD type  -     amphetamine-dextroamphetamine (ADDERALL XR) 15 MG 24 hr capsule; Take 1 capsule (15 mg) by mouth daily for 30 days  -     amphetamine-dextroamphetamine (ADDERALL XR) 15 MG 24 hr capsule; Take 1 capsule (15 mg) by mouth daily for 30 days  -     [facility]; Future  Has been on stimulant for many months without concerns or problems. PDMP reviewed and appropriate. Temporary refill given pending establishing psych care.      Bipolar I disorder (H)  -     ARIPiprazole (ABILIFY) 20 MG tablet; Take 1 tablet (20 mg) by mouth daily  -     [facility]; Future  Appears stable today upon meeting. Temporary refill given pending establishing psych care.    Episode of recurrent major depressive disorder, unspecified depression episode severity (H)  -     citalopram (CELEXA) 20 MG tablet; Take 1 tablet (20 mg) by mouth daily  -     [facility]; Future  Appears stable today upon meeting. Temporary refill given pending establishing psych care.    Chronic prescription opiate use  -     buprenorphine HCl-naloxone HCl (SUBOXONE) 8-2 MG per film; Place 1 Film under the tongue 2 times daily  Appears stable today upon meeting. Temporary refill given pending establishing psych care.  PDMP appropriate.     History of hepatitis C  Follows with GI. Just completed treatment for Hep C with mayvret. Doing well. Has follow up in a few weeks.          Review of prior external note(s) from - CareEverywhere information from [facility] care coordination reviewed         Nicotine/Tobacco Cessation:  She reports that she has been smoking cigarettes. She has been smoking an average of .25 packs per day. She has never used  "smokeless tobacco.  Nicotine/Tobacco Cessation Plan:   Information offered: Patient not interested at this time      BMI:   Estimated body mass index is 32.67 kg/m  as calculated from the following:    Height as of this encounter: 1.651 m (5' 5\").    Weight as of this encounter: 89 kg (196 lb 4.8 oz).   Weight management plan: Discussed healthy diet and exercise guidelines    No follow-ups on file.    Logan Haddad [facility] SARBJIT Reilly is a 42 year old, presenting for the following health issues:  New Patient (Establishing care with new PCP ), Referral (Psych referral ), and Medication Therapy Management (Discuss medications and refills )        6/2/2023     8:06 AM   Additional Questions   Roomed by Kiel   Accompanied by Self     HPI   New patient      ADHD  - Dx Made at [facility] and diagnostic forwarded to [facility]  - 2022 was year of dx after sobriety    Opiate use  - Notes for chronic pain  Suboxone now for 8 months  8-2 BID dosing  Inured back was the initial cause of opiate use       Migraines  - takes over the counter- Excedrin migraine   8 HA month    PAP  Dr. Gautam group. Last done Spring 2022  Normal results      10/30/2018    11:13 AM 6/2/2023     8:09 AM   PHQ   PHQ-9 Total Score 0 0   Q9: Thoughts of better off dead/self-harm past 2 weeks Not at all Not at all         10/30/2018    11:13 AM 6/2/2023     8:09 AM   JOVANY-7 SCORE   Total Score 3 0               Objective    /82   Pulse 103   Resp 18   Ht 1.651 m (5' 5\")   Wt 89 kg (196 lb 4.8 oz)   SpO2 98%   BMI 32.67 kg/m    Body mass index is 32.67 kg/m .  Physical Exam   General: Alert and oriented, in no acute distress.  Skin: Warm and dry, no abnormalities noted.  Eyes: Extra-ocular muscles grossly intact, pupils equal.  ENT: Speech intact, nasal passages open, no hearing impairment noted.  CV: No cyanosis or pallor, warm and well perfused.  Respiratory: No respiratory distress, no accessory muscle " use.  Neuro: Gait and station normal, comprehension intact. Gross and fine motor skills intact.   Psychiatric: Mood and affect appear normal.   Extremities: Warm, able to move all four extremities at will.

## 2023-07-06 ENCOUNTER — TELEPHONE (OUTPATIENT)
Dept: FAMILY MEDICINE | Facility: CLINIC | Age: 43
End: 2023-07-06
Payer: MEDICARE

## 2023-07-06 DIAGNOSIS — F90.9 ATTENTION DEFICIT HYPERACTIVITY DISORDER (ADHD), UNSPECIFIED ADHD TYPE: ICD-10-CM

## 2023-07-06 RX ORDER — DEXTROAMPHETAMINE SACCHARATE, AMPHETAMINE ASPARTATE MONOHYDRATE, DEXTROAMPHETAMINE SULFATE AND AMPHETAMINE SULFATE 3.75; 3.75; 3.75; 3.75 MG/1; MG/1; MG/1; MG/1
15 CAPSULE, EXTENDED RELEASE ORAL DAILY
Qty: 30 CAPSULE | Refills: 0 | Status: SHIPPED | OUTPATIENT
Start: 2023-07-06 | End: 2023-07-28

## 2023-07-06 NOTE — TELEPHONE ENCOUNTER
7/6/2023    Care Coordinator attempted to contact patient on behalf of Dr. Haddad. CC to check in on patient getting established with psych. CC provided direct number for patient to call back ([phone])      Zoila Neville  Care Coordinator

## 2023-07-06 NOTE — TELEPHONE ENCOUNTER
Melba was seen today for medication question.    Diagnoses and all orders for this visit:    Attention deficit hyperactivity disorder (ADHD), unspecified ADHD type  -     amphetamine-dextroamphetamine (ADDERALL XR) 15 MG 24 hr capsule; Take 1 capsule (15 mg) by mouth daily      30 days meds written. Care coordination please call to check in on how pt is establishing psych care, as long term, these psychoactive medications shouldcome from psych.     Logan Haddad, DO

## 2023-07-06 NOTE — TELEPHONE ENCOUNTER
[facility] phone call message- medication clarification/question:    Full Medication Name: amphetamine-dextroamphetamine (ADDERALL XR) 15 MG 24 hr capsule   Dose: Take 1 capsule (15 mg) by mouth daily for 30 days - Oral    Question: Patient seeking medication transfer to listed pharmacy due to medication shortage.     Pharmacy confirmed as    [facility] [place] [place] [id number] - [place], [place] - [address] [place] [address] AVE  : Yes    OK to leave a message on voice mail? Yes    Primary language: English      needed? No    Call taken on July 6, 2023 at 9:26 AM by Shivam Suazo

## 2023-07-28 ENCOUNTER — TELEPHONE (OUTPATIENT)
Dept: FAMILY MEDICINE | Facility: CLINIC | Age: 43
End: 2023-07-28
Payer: MEDICARE

## 2023-07-28 DIAGNOSIS — F90.9 ATTENTION DEFICIT HYPERACTIVITY DISORDER (ADHD), UNSPECIFIED ADHD TYPE: ICD-10-CM

## 2023-07-28 RX ORDER — DEXTROAMPHETAMINE SACCHARATE, AMPHETAMINE ASPARTATE MONOHYDRATE, DEXTROAMPHETAMINE SULFATE AND AMPHETAMINE SULFATE 3.75; 3.75; 3.75; 3.75 MG/1; MG/1; MG/1; MG/1
15 CAPSULE, EXTENDED RELEASE ORAL DAILY
Qty: 30 CAPSULE | Refills: 0 | Status: SHIPPED | OUTPATIENT
Start: 2023-07-28 | End: 2023-09-15

## 2023-07-28 NOTE — TELEPHONE ENCOUNTER
[facility] phone call message- patient requesting a refill:    Full Medication Name: amphetamine-dextroamphetamine (ADDERALL XR) 15 MG 24 hr capsule         Pharmacy confirmed as     [id number] - [place], [place] - [address] [place] [address]  Phone: [phone] Fax: [phone]    : Yes    Additional Comments: The patient states she can not get in for her MH appt until the 29th of August. The patient is requesting a partial refill until her appt. The patient also sends her apologies.     OK to leave a message on voice mail? Yes      Primary language: English      needed? No    Call taken on July 28, 2023 at 12:34 PM by Marta Ortiz

## 2023-07-29 NOTE — TELEPHONE ENCOUNTER
Melba was seen today for refill request.    Diagnoses and all orders for this visit:    Attention deficit hyperactivity disorder (ADHD), unspecified ADHD type  -     amphetamine-dextroamphetamine (ADDERALL XR) 15 MG 24 hr capsule; Take 1 capsule (15 mg) by mouth daily      [facility] approved. 30 day supply sent.    Logan Haddad DO

## 2023-07-31 DIAGNOSIS — F31.9 BIPOLAR I DISORDER (H): ICD-10-CM

## 2023-07-31 RX ORDER — ARIPIPRAZOLE 20 MG/1
TABLET ORAL
Qty: 30 TABLET | Refills: 0 | Status: SHIPPED | OUTPATIENT
Start: 2023-07-31

## 2023-07-31 NOTE — TELEPHONE ENCOUNTER
"Request for medication refill:  ARIPiprazole (ABILIFY) 20 MG tablet     Providers if patient needs an appointment and you are willing to give a one month supply please refill for one month and  send a letter/MyChart using \".SMILLIMITEDREFILL\" .smillimited and route chart to \"P [facility] \" (Giving one month refill in non controlled medications is strongly recommended before denial)    If refill has been denied, meaning absolutely no refills without visit, please complete the smart phrase \".smirxrefuse\" and route it to the \"P [facility] MED REFILLS\"  pool to inform the patient and the pharmacy.    Cate Maloney MA      "

## 2023-09-15 DIAGNOSIS — F90.9 ATTENTION DEFICIT HYPERACTIVITY DISORDER (ADHD), UNSPECIFIED ADHD TYPE: ICD-10-CM

## 2023-09-15 RX ORDER — DEXTROAMPHETAMINE SACCHARATE, AMPHETAMINE ASPARTATE MONOHYDRATE, DEXTROAMPHETAMINE SULFATE AND AMPHETAMINE SULFATE 3.75; 3.75; 3.75; 3.75 MG/1; MG/1; MG/1; MG/1
15 CAPSULE, EXTENDED RELEASE ORAL DAILY
Qty: 30 CAPSULE | Refills: 0 | Status: SHIPPED | OUTPATIENT
Start: 2023-09-15

## 2023-09-15 NOTE — TELEPHONE ENCOUNTER
[facility] phone call message- patient requesting a refill:    Full Medication Name: amphetamine-dextroamphetamine (ADDERALL XR) 15 MG 24 hr capsule     Dose: Take 1 capsule (15 mg) by mouth daily - Oral     Pharmacy confirmed as   [id number] - [place], [place] - [address] [facility] [address]  [address]  .  [facility] [address]  Phone: [phone] Fax: [phone]  : Yes    Additional Comments: PATIENT WOULD LIKE A BRIDGE OF THIS MEDICATION UNTIL THERE VISIT WITH PCP ON  10/18    OK to leave a message on voice mail? Yes    Primary language: English      needed? No    Call taken on September 15, 2023 at 9:32 AM by Horacio Evans

## 2023-09-15 NOTE — TELEPHONE ENCOUNTER
"Last seen 6/2/23, PATIENT WOULD LIKE A BRIDGE OF THIS MEDICATION UNTIL THERE VISIT WITH PCP ON 10/18. Sending to provider for review and refill if appropriate.    Request for medication refill:    amphetamine-dextroamphetamine (ADDERALL XR) 15 MG 24 hr capsule     Providers if patient needs an appointment and you are willing to give a one month supply please refill for one month and  send a letter/MyChart using \".SMILLIMITEDREFILL\" .smillimited and route chart to \"P [facility] \" (Giving one month refill in non controlled medications is strongly recommended before denial)    If refill has been denied, meaning absolutely no refills without visit, please complete the smart phrase \".smirxrefuse\" and route it to the \"P [facility] MED REFILLS\"  pool to inform the patient and the pharmacy.    Erma Portillo RN    "

## 2024-02-17 ENCOUNTER — HOSPITAL ENCOUNTER (EMERGENCY)
Facility: CLINIC | Age: 44
Discharge: HOME | End: 2024-02-17
Attending: EMERGENCY MEDICINE | Admitting: EMERGENCY MEDICINE
Payer: MEDICARE

## 2024-02-17 VITALS
DIASTOLIC BLOOD PRESSURE: 91 MMHG | HEIGHT: 65 IN | RESPIRATION RATE: 18 BRPM | TEMPERATURE: 97.7 F | OXYGEN SATURATION: 99 % | HEART RATE: 88 BPM | WEIGHT: 160 LBS | BODY MASS INDEX: 26.66 KG/M2 | SYSTOLIC BLOOD PRESSURE: 129 MMHG

## 2024-02-17 DIAGNOSIS — F41.0 ANXIETY ATTACK: ICD-10-CM

## 2024-02-17 PROCEDURE — 250N000013 HC RX MED GY IP 250 OP 250 PS 637: Performed by: EMERGENCY MEDICINE

## 2024-02-17 PROCEDURE — 99283 EMERGENCY DEPT VISIT LOW MDM: CPT

## 2024-02-17 RX ORDER — LORAZEPAM 1 MG/1
1 TABLET ORAL ONCE
Status: COMPLETED | OUTPATIENT
Start: 2024-02-17 | End: 2024-02-17

## 2024-02-17 RX ADMIN — LORAZEPAM 1 MG: 1 TABLET ORAL at 12:23

## 2024-02-17 ASSESSMENT — ACTIVITIES OF DAILY LIVING (ADL)
ADLS_ACUITY_SCORE: 35
ADLS_ACUITY_SCORE: 35

## 2024-02-17 NOTE — ED NOTES
Bed: [facility]  Expected date:   Expected time:   Means of arrival:   Comments:  A [age] F mental health on a hold

## 2024-02-17 NOTE — ED TRIAGE NOTES
"Patient BIBA from street in [place]. Bystanders called because patient was having a PTSD/panic attack. Pt has a history or bipolar and borderline personality. Disorganized thoughts and speech. Admits to smoking meth this morning. Unable to care for self. \"Therapy\" cat at bedside. Arrives on hold.      Triage Assessment (Adult)       Row Name 02/17/24 1106          Triage Assessment    Airway WDL WDL        Respiratory WDL    Respiratory WDL WDL        Skin Circulation/Temperature WDL    Skin Circulation/Temperature WDL --  wounds in various stages of healing        Cardiac WDL    Cardiac WDL WDL        Peripheral/Neurovascular WDL    Peripheral Neurovascular WDL WDL        Cognitive/Neuro/Behavioral WDL    Cognitive/Neuro/Behavioral WDL --  meth use                     "

## 2024-02-17 NOTE — DISCHARGE INSTRUCTIONS
Please follow-up with your normal providers.  If you have any concerns about your mental health, thoughts hurting yourself or others or other concerns return to the ER at any time.  We encourage that you stop any methamphetamine use.

## 2024-02-17 NOTE — ED NOTES
Patient informed about rules regarding cat. As patient is homeless and family lives far away there is not option to get cat picked up.     Patient is agreeable to keep cat in carrier with door to room closed at all times. Cat is not to be let out nor handled. Carrier must remain in area of room visible by video monitoring. If rules are not followed, cat will be removed to secure office area and kept safe. Patient verbalizes understanding and is agreeable to plan.     Security Officers notified of agreed upon plan.

## 2024-02-17 NOTE — ED PROVIDER NOTES
"  History     Chief Complaint:  Psychiatric Evaluation       HPI   Melba Cornelius is a 43 year old female who presents for psychiatric evaluation. She was at a bus stop today when she had PTSD of a traumatic event, causing her to have a panic attack. She states she gently hit her head on the pavement while hyperventilating. She denies head injury or loss of consciousness. Bystanders were concerned and called EMS. Here, she states her symptoms are improved and she denies SI/HI. She last used meth 6 hours ago. She has not taken her Adderall in 2 months and is trying to get follow up with her PCP to address this.      Independent Historian:   None - Patient Only    Review of External Notes:   's handoff report.  Reviewed mental health visit from 1/25/24 regarding ongoing treatments for PTSD      Medications:    Adderall  Abilify  Suboxone  Keflex  Celexa  Roxicodone     Past Medical History:    Depression  Migraines  PTSD  Polysubstance abuse  Suicide attempt  Seizures  Bipolar I disorder  BPD  ADHD  Alcohol abuse    Past Surgical History:    T & A  Heel surgery  Back surgery     Physical Exam   Patient Vitals for the past 24 hrs:   BP Temp Temp src Pulse Resp SpO2 Height Weight   02/17/24 1121 (!) 136/99 97.7  F (36.5  C) Temporal 76 18 100 % 1.651 m (5' 5\") 72.6 kg (160 lb)        Physical Exam  General: Resting on the bed.  Head: No obvious trauma to head.  Ears, Nose, Throat:  External ears normal.  Nose normal.   Eyes:  Conjunctivae clear.  Pupils are equal, round, and reactive.   Neck: Normal range of motion.  Neck supple.   CV: Regular rate and rhythm.  No murmurs.      Respiratory: Effort normal and breath sounds normal.  No wheezing or crackles.   Gastrointestinal: Soft.  No distension. There is no tenderness.   Musculoskeletal: Normal range of motion.  Non tender extremities to palpations.    Neuro: Alert. Moving all extremities appropriately.  Normal speech.  CN II-XII grossly intact, no pronator " drift.  Gross muscle strength intact of the proximal and distal bilateral upper and lower extremities.  Sensation intact to light touch in all 4 extremities.  Normal gait.    Skin: Skin is warm and dry.  No rash noted.   Psych: Normal mood and affect. Behavior is normal. Calm cooperative.  Pressured speech.  Denies SI or HI     Emergency Department Course     Interventions:  Medications   LORazepam (ATIVAN) tablet 1 mg (1 mg Oral $Given 24 1223)        Independent Interpretation (X-rays, CTs, rhythm strip):  None    Assessments/Consultations/Discussion of Management or Tests:  ED Course as of 24 1345   Sat 2024   1217 I obtained history and examined the patient, as noted above.   1339 I rechecked and updated the patient. She feels improved and is comfortable with discharge and follow up with her PCP.       Social Determinants of Health affecting care:   Homelessness/Housing Insecurity and Stress/Adjustment Disorders    Disposition:  The patient was discharged to home.     Impression & Plan      Medical Decision Makin-year-old female presents emergency department after having a anxiety attack and PTSD attack.  Vital signs are reassuring.  Broad differential was pursued including limited to alcohol abuse, toxic alcohols, trauma, decompensated mental illness, etc. overall patient is well-appearing nontoxic.  She is very calm and cooperative.  Her speech is somewhat pressured but she otherwise appears to be clinically sober.  She does endorse recent methamphetamine use.  She denies any street drug use beyond that or alcohol use.  Reports hitting her head gently on the cement but no loss of conscious.  There is no obvious signs of trauma on exam.  Her neurologic exam is normal.  She is not on blood thinners.  I do not think that head imaging is indicated.  She was observed in the emergency department and continued to clear and be more appropriate.  She denies suicidal or homicidal ideation.  No  recurrence of her abnormal behavior.  She is very calm and cooperative.  She denies hallucinations, paranoia etc.  She reports that she has a  that is helping her with housing and her mental illness.  She at this time declines any further assessment or services.  Given that she appears clinically sober and does not appear to be immediate threat to self or others she seems reasonable for outpatient management.  She is good outpatient services.  Return precautions were discussed and patient discharged home.      Diagnosis:    ICD-10-CM    1. Anxiety attack  F41.0            Discharge Medications:  New Prescriptions    No medications on file        Scribe Disclosure:  James AGOSTO, am serving as a scribe at 12:15 PM on 2/17/2024 to document services personally performed by Tanya Jones MD based on my observations and the provider's statements to me.        Tanya Jones MD  02/17/24 2607

## 2024-02-29 ENCOUNTER — TELEPHONE (OUTPATIENT)
Dept: FAMILY MEDICINE | Facility: CLINIC | Age: 44
End: 2024-02-29
Payer: MEDICARE

## 2024-02-29 NOTE — TELEPHONE ENCOUNTER
[facility] SW received message from patient's assigned PCP - DR FOURNIER to attempt telephone outreach to patient. Request to see where patient is receiving care of if she would like to get schedule at [facility] for follow up. Patient may also benefit from [facility] coordination services.     JIA attempted outreach, unfortunately the phone number listed is not in service. Will notify provider.     Renea Deng, [facility]  Behavioral Health Home- Social Work Care Coordinator